# Patient Record
Sex: FEMALE | Race: WHITE
[De-identification: names, ages, dates, MRNs, and addresses within clinical notes are randomized per-mention and may not be internally consistent; named-entity substitution may affect disease eponyms.]

---

## 2017-05-16 ENCOUNTER — HOSPITAL ENCOUNTER (EMERGENCY)
Dept: HOSPITAL 47 - EC | Age: 32
LOS: 1 days | Discharge: OTHER INSTITUTION | End: 2017-05-17
Payer: COMMERCIAL

## 2017-05-16 VITALS — RESPIRATION RATE: 18 BRPM

## 2017-05-16 DIAGNOSIS — F31.9: ICD-10-CM

## 2017-05-16 DIAGNOSIS — F41.9: ICD-10-CM

## 2017-05-16 DIAGNOSIS — R45.851: ICD-10-CM

## 2017-05-16 DIAGNOSIS — R45.850: ICD-10-CM

## 2017-05-16 DIAGNOSIS — F39: Primary | ICD-10-CM

## 2017-05-16 DIAGNOSIS — F17.200: ICD-10-CM

## 2017-05-16 DIAGNOSIS — Z79.899: ICD-10-CM

## 2017-05-16 DIAGNOSIS — Z88.6: ICD-10-CM

## 2017-05-16 DIAGNOSIS — Z88.5: ICD-10-CM

## 2017-05-16 PROCEDURE — 36415 COLL VENOUS BLD VENIPUNCTURE: CPT

## 2017-05-16 PROCEDURE — 80306 DRUG TEST PRSMV INSTRMNT: CPT

## 2017-05-16 PROCEDURE — 85025 COMPLETE CBC W/AUTO DIFF WBC: CPT

## 2017-05-16 PROCEDURE — 81025 URINE PREGNANCY TEST: CPT

## 2017-05-16 PROCEDURE — 82075 ASSAY OF BREATH ETHANOL: CPT

## 2017-05-16 PROCEDURE — 99285 EMERGENCY DEPT VISIT HI MDM: CPT

## 2017-05-16 PROCEDURE — 81001 URINALYSIS AUTO W/SCOPE: CPT

## 2017-05-16 PROCEDURE — 80053 COMPREHEN METABOLIC PANEL: CPT

## 2017-05-16 NOTE — ED
Psych HPI





- General


Source: patient, RN notes reviewed, old records reviewed


Mode of arrival: ambulatory





<JudyMervat - Last Filed: 05/17/17 02:41>





<Richar Navas - Last Filed: 05/17/17 06:13>





- General


Chief Complaint: Psychiatric Symptoms


Stated Complaint: suicidal


Time Seen by Provider: 05/16/17 23:06





- History of Present Illness


Initial Comments: 





this is a 31-year-old female presenting to the emergency department with chief 

complaint of suicidal ideation.  She also reports that she's had thoughts of 

homicidal ideations.  She was not coming U she would want to kill.  Patient 

reports that she thought about trying falling asleep.  Patient states that she'

s been admitted for inpatient treatment in the past.  She states that she does 

have medications and follows up with [facility] and takes them regularly.  She reports 

that she's been feeling this way over the past few weeks.  Patient is here with 

her friend.  Friend reports that she has been somewhat neglectful to her 

children.  Patient is very withdrawn. (Mervat Kim)





- Related Data


 Home Medications











 Medication  Instructions  Recorded  Confirmed


 


Lithium Carbonate 300 mg PO DAILY 05/16/17 05/16/17


 


Lurasidone [Latuda] 40 mg PO DAILY 05/16/17 05/16/17











 Allergies











Allergy/AdvReac Type Severity Reaction Status Date / Time


 


acetaminophen Allergy  Unknown Verified 05/16/17 19:58





[From Tylenol-Codeine #3]     


 


codeine phosphate Allergy  Unknown Verified 05/16/17 19:58





[From Tylenol-Codeine #3]     














Review of Systems


ROS Other: All systems not noted in ROS Statement are negative.





<Cecy Kimily - Last Filed: 05/17/17 02:41>


ROS Other: All systems not noted in ROS Statement are negative.





<Richar Navas - Last Filed: 05/17/17 06:13>


ROS Statement: 


Those systems with pertinent positive or pertinent negative responses have been 

documented in the HPI.








Past Medical History


Past Medical History: No Reported History


Additional Past Medical History / Comment(s): First pregnancy was a Vaginal 

Delivery in 2007, 9 lbs. 1 ounce.  This is her second pregnancy.  she has had 

good prenatal care with me since 11 weeks gestation.her blood type is A+ 

antibody is negative, rubella immune, RPR nonreactive, HIV nonreactive, 

hepatitis B negative. She declined quad screen but had a normal anatomy 

ultrasoundat 19 weeks. group beta strep is negative.


History of Any Multi-Drug Resistant Organisms: None Reported


Past Surgical History: Orthopedic Surgery


Additional Past Surgical History / Comment(s): 1.surgery on right leg 2004, tavia 

in her femur. 2. LEEP


Past Anesthesia/Blood Transfusion Reactions: No Reported Reaction


Past Psychological History: Anxiety, Bipolar, Depression


Smoking Status: Current every day smoker


Past Alcohol Use History: None Reported


Past Drug Use History: None Reported





<Mervat Kim - Last Filed: 05/17/17 02:41>





General Exam


Limitations: no limitations


General appearance: alert, in no apparent distress


Head exam: Present: atraumatic, normocephalic, normal inspection


Eye exam: Present: normal appearance, PERRL, EOMI.  Absent: scleral icterus, 

conjunctival injection, periorbital swelling


ENT exam: Present: normal exam, mucous membranes moist


Neck exam: Present: normal inspection.  Absent: tenderness, meningismus, 

lymphadenopathy


Respiratory exam: Present: normal lung sounds bilaterally.  Absent: respiratory 

distress, wheezes, rales, rhonchi, stridor


Cardiovascular Exam: Present: regular rate, normal rhythm, normal heart sounds.

  Absent: systolic murmur, diastolic murmur, rubs, gallop, clicks


GI/Abdominal exam: Present: soft, normal bowel sounds.  Absent: distended, 

tenderness, guarding, rebound, rigid


Extremities exam: Present: normal inspection, full ROM, normal capillary 

refill.  Absent: tenderness, pedal edema, joint swelling, calf tenderness


Back exam: Present: normal inspection


Neurological exam: Present: alert, oriented X3, CN II-XII intact


Psychiatric exam: Present: normal affect, depressed, homicidal ideation, 

suicidal ideation (Patient reports that she wants to harm herself.  Patient 

reports that she does not care if she would turn the stove on and follow sleep.

  She reports that it she would even do this of her children were in the house.)

.  Absent: normal mood


Skin exam: Present: warm, dry, intact, normal color.  Absent: rash





<Mervat Kim - Last Filed: 05/17/17 02:41>





<Richar Navas - Last Filed: 05/17/17 06:13>





- General Exam Comments


Initial Comments: 





This is a 31-year-old female.  No distress. (Mervat Kim)





Medical Decision Making





<Mervat iKm - Last Filed: 05/17/17 02:41>





- Lab Data


Result diagrams: 


 05/17/17 04:25





 05/17/17 04:25





<Richar Navas - Last Filed: 05/17/17 06:13>





- Medical Decision Making





This is a 31-year-old female with suicidal ideations.  Patient was evaluated by 

EPS after being medically clear.  EPS stated that she may have multiple 

alarming statements about suicide and homicide.  Patient will be petitioned.  

At this time patient will be in a psychiatric hold in the emergency department 

until his available bed is there. (Mervat Kim)





Briefly spoke with patient, who had been sleeping, in order to fill clinical 

certification.





I saw this patient in conjunction with the physician assistant.  I performed 

independent history and physical exam.  Agree with case management. (Richar Navas)





- Lab Data





 Lab Results











  05/17/17 05/17/17 05/17/17 Range/Units





  01:18 01:18 04:25 


 


WBC    5.2  (3.8-10.6)  k/uL


 


RBC    4.66  (3.80-5.40)  m/uL


 


Hgb    13.8  (11.4-16.0)  gm/dL


 


Hct    40.6  (34.0-46.0)  %


 


MCV    87.3  (80.0-100.0)  fL


 


MCH    29.7  (25.0-35.0)  pg


 


MCHC    34.0  (31.0-37.0)  g/dL


 


RDW    12.7  (11.5-15.5)  %


 


Plt Count    152  (150-450)  k/uL


 


Sodium     (137-145)  mmol/L


 


Potassium     (3.5-5.1)  mmol/L


 


Chloride     ()  mmol/L


 


Carbon Dioxide     (22-30)  mmol/L


 


Anion Gap     mmol/L


 


BUN     (7-17)  mg/dL


 


Creatinine     (0.52-1.04)  mg/dL


 


Est GFR (MDRD) Af Amer     (>60 ml/min/1.73 sqM)  


 


Est GFR (MDRD) Non-Af     (>60 ml/min/1.73 sqM)  


 


Glucose     (74-99)  mg/dL


 


Calcium     (8.4-10.2)  mg/dL


 


Total Bilirubin     (0.2-1.3)  mg/dL


 


AST     (14-36)  U/L


 


ALT     (9-52)  U/L


 


Alkaline Phosphatase     ()  U/L


 


Total Protein     (6.3-8.2)  g/dL


 


Albumin     (3.5-5.0)  g/dL


 


Urine Color  Yellow    


 


Urine Appearance  Cloudy H    (Clear)  


 


Urine pH  5.5    (5.0-8.0)  


 


Ur Specific Gravity  1.026    (1.001-1.035)  


 


Urine Protein  Trace H    (Negative)  


 


Urine Glucose (UA)  Negative    (Negative)  


 


Urine Ketones  Negative    (Negative)  


 


Urine Blood  Negative    (Negative)  


 


Urine Nitrite  Negative    (Negative)  


 


Urine Bilirubin  Negative    (Negative)  


 


Urine Urobilinogen  4.0    (<2.0)  mg/dL


 


Ur Leukocyte Esterase  Negative    (Negative)  


 


Urine RBC  <1    (0-5)  /hpf


 


Urine WBC  2    (0-5)  /hpf


 


Ur Squamous Epith Cells  7 H    (0-4)  /hpf


 


Amorphous Sediment  Rare H    (None)  /hpf


 


Urine Bacteria  Rare H    (None)  /hpf


 


Urine Mucus  Few H    (None)  /hpf


 


Urine HCG, Qual   Not Detected   (Not Detectd)  


 


Urine Opiates Screen  Not Detected    (NotDetected)  


 


Ur Oxycodone Screen  Not Detected    (NotDetected)  


 


Urine Methadone Screen  Not Detected    (NotDetected)  


 


Ur Propoxyphene Screen  Not Detected    (NotDetected)  


 


Ur Barbiturates Screen  Not Detected    (NotDetected)  


 


U Tricyclic Antidepress  Not Detected    (NotDetected)  


 


Ur Phencyclidine Scrn  Not Detected    (NotDetected)  


 


Ur Amphetamines Screen  Not Detected    (NotDetected)  


 


U Methamphetamines Scrn  Not Detected    (NotDetected)  


 


U Benzodiazepines Scrn  Not Detected    (NotDetected)  


 


Urine Cocaine Screen  Not Detected    (NotDetected)  


 


U Marijuana (THC) Screen  Detected H    (NotDetected)  














  05/17/17 Range/Units





  04:25 


 


WBC   (3.8-10.6)  k/uL


 


RBC   (3.80-5.40)  m/uL


 


Hgb   (11.4-16.0)  gm/dL


 


Hct   (34.0-46.0)  %


 


MCV   (80.0-100.0)  fL


 


MCH   (25.0-35.0)  pg


 


MCHC   (31.0-37.0)  g/dL


 


RDW   (11.5-15.5)  %


 


Plt Count   (150-450)  k/uL


 


Sodium  139  (137-145)  mmol/L


 


Potassium  4.1  (3.5-5.1)  mmol/L


 


Chloride  106  ()  mmol/L


 


Carbon Dioxide  25  (22-30)  mmol/L


 


Anion Gap  8  mmol/L


 


BUN  7  (7-17)  mg/dL


 


Creatinine  0.80  (0.52-1.04)  mg/dL


 


Est GFR (MDRD) Af Amer  >60  (>60 ml/min/1.73 sqM)  


 


Est GFR (MDRD) Non-Af  >60  (>60 ml/min/1.73 sqM)  


 


Glucose  89  (74-99)  mg/dL


 


Calcium  9.5  (8.4-10.2)  mg/dL


 


Total Bilirubin  0.4  (0.2-1.3)  mg/dL


 


AST  46 H  (14-36)  U/L


 


ALT  99 H  (9-52)  U/L


 


Alkaline Phosphatase  49  ()  U/L


 


Total Protein  6.2 L  (6.3-8.2)  g/dL


 


Albumin  3.7  (3.5-5.0)  g/dL


 


Urine Color   


 


Urine Appearance   (Clear)  


 


Urine pH   (5.0-8.0)  


 


Ur Specific Gravity   (1.001-1.035)  


 


Urine Protein   (Negative)  


 


Urine Glucose (UA)   (Negative)  


 


Urine Ketones   (Negative)  


 


Urine Blood   (Negative)  


 


Urine Nitrite   (Negative)  


 


Urine Bilirubin   (Negative)  


 


Urine Urobilinogen   (<2.0)  mg/dL


 


Ur Leukocyte Esterase   (Negative)  


 


Urine RBC   (0-5)  /hpf


 


Urine WBC   (0-5)  /hpf


 


Ur Squamous Epith Cells   (0-4)  /hpf


 


Amorphous Sediment   (None)  /hpf


 


Urine Bacteria   (None)  /hpf


 


Urine Mucus   (None)  /hpf


 


Urine HCG, Qual   (Not Detectd)  


 


Urine Opiates Screen   (NotDetected)  


 


Ur Oxycodone Screen   (NotDetected)  


 


Urine Methadone Screen   (NotDetected)  


 


Ur Propoxyphene Screen   (NotDetected)  


 


Ur Barbiturates Screen   (NotDetected)  


 


U Tricyclic Antidepress   (NotDetected)  


 


Ur Phencyclidine Scrn   (NotDetected)  


 


Ur Amphetamines Screen   (NotDetected)  


 


U Methamphetamines Scrn   (NotDetected)  


 


U Benzodiazepines Scrn   (NotDetected)  


 


Urine Cocaine Screen   (NotDetected)  


 


U Marijuana (THC) Screen   (NotDetected)  














Disposition





<Mervat Kim - Last Filed: 05/17/17 02:41>





<Richar Navas - Last Filed: 05/17/17 06:13>


Clinical Impression: 


 Mood disorder





Disposition: ADMITTED AS IP TO THIS HOSP


Condition: Fair


Referrals: 


Pankaj Acosta MD [Primary Care Provider] - 1-2 days

## 2017-05-17 VITALS — DIASTOLIC BLOOD PRESSURE: 61 MMHG | SYSTOLIC BLOOD PRESSURE: 93 MMHG | HEART RATE: 68 BPM | TEMPERATURE: 98.1 F

## 2017-05-17 LAB
ALP SERPL-CCNC: 49 U/L (ref 38–126)
ALT SERPL-CCNC: 99 U/L (ref 9–52)
ANION GAP SERPL CALC-SCNC: 8 MMOL/L
AST SERPL-CCNC: 46 U/L (ref 14–36)
BASOPHILS # BLD AUTO: 0.1 K/UL (ref 0–0.2)
BASOPHILS NFR BLD AUTO: 1 %
BUN SERPL-SCNC: 7 MG/DL (ref 7–17)
CALCIUM SPEC-MCNC: 9.5 MG/DL (ref 8.4–10.2)
CH: 29.7
CHCM: 34.2
CHLORIDE SERPL-SCNC: 106 MMOL/L (ref 98–107)
CO2 SERPL-SCNC: 25 MMOL/L (ref 22–30)
EOSINOPHIL # BLD AUTO: 0.3 K/UL (ref 0–0.7)
EOSINOPHIL NFR BLD AUTO: 6 %
ERYTHROCYTE [DISTWIDTH] IN BLOOD BY AUTOMATED COUNT: 4.66 M/UL (ref 3.8–5.4)
ERYTHROCYTE [DISTWIDTH] IN BLOOD: 12.7 % (ref 11.5–15.5)
GLUCOSE SERPL-MCNC: 89 MG/DL (ref 74–99)
HCT VFR BLD AUTO: 40.6 % (ref 34–46)
HDW: 2.62
HGB BLD-MCNC: 13.8 GM/DL (ref 11.4–16)
LUC NFR BLD AUTO: 2 %
LYMPHOCYTES # SPEC AUTO: 2.6 K/UL (ref 1–4.8)
LYMPHOCYTES NFR SPEC AUTO: 50 %
MCH RBC QN AUTO: 29.7 PG (ref 25–35)
MCHC RBC AUTO-ENTMCNC: 34 G/DL (ref 31–37)
MCV RBC AUTO: 87.3 FL (ref 80–100)
MONOCYTES # BLD AUTO: 0.2 K/UL (ref 0–1)
MONOCYTES NFR BLD AUTO: 5 %
NEUTROPHILS # BLD AUTO: 1.9 K/UL (ref 1.3–7.7)
NEUTROPHILS NFR BLD AUTO: 37 %
NON-AFRICAN AMERICAN GFR(MDRD): >60
PARTICLE COUNT: 7014
PH UR: 5.5 [PH] (ref 5–8)
POTASSIUM SERPL-SCNC: 4.1 MMOL/L (ref 3.5–5.1)
PROT SERPL-MCNC: 6.2 G/DL (ref 6.3–8.2)
RBC UR QL: <1 /HPF (ref 0–5)
SODIUM SERPL-SCNC: 139 MMOL/L (ref 137–145)
SP GR UR: 1.03 (ref 1–1.03)
SQUAMOUS UR QL AUTO: 7 /HPF (ref 0–4)
UA BILLING (MACRO VS. MICRO): (no result)
UROBILINOGEN UR QL STRIP: 4 MG/DL (ref ?–2)
WBC # BLD AUTO: 0.12 10*3/UL
WBC # BLD AUTO: 5.2 K/UL (ref 3.8–10.6)
WBC #/AREA URNS HPF: 2 /HPF (ref 0–5)
WBC (PEROX): 4.97

## 2017-05-25 ENCOUNTER — HOSPITAL ENCOUNTER (EMERGENCY)
Dept: HOSPITAL 47 - EC | Age: 32
Discharge: HOME | End: 2017-05-25
Payer: COMMERCIAL

## 2017-05-25 VITALS
HEART RATE: 100 BPM | DIASTOLIC BLOOD PRESSURE: 60 MMHG | TEMPERATURE: 98.1 F | RESPIRATION RATE: 18 BRPM | SYSTOLIC BLOOD PRESSURE: 114 MMHG
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
## 2017-05-25 DIAGNOSIS — Z79.899: ICD-10-CM

## 2017-05-25 DIAGNOSIS — Z88.5: ICD-10-CM

## 2017-05-25 DIAGNOSIS — Z88.6: ICD-10-CM

## 2017-05-25 DIAGNOSIS — F17.200: ICD-10-CM

## 2017-05-25 DIAGNOSIS — F31.9: ICD-10-CM

## 2017-05-25 DIAGNOSIS — S39.012A: Primary | ICD-10-CM

## 2017-05-25 PROCEDURE — 72070 X-RAY EXAM THORAC SPINE 2VWS: CPT

## 2017-05-25 PROCEDURE — 72100 X-RAY EXAM L-S SPINE 2/3 VWS: CPT

## 2017-05-25 PROCEDURE — 99283 EMERGENCY DEPT VISIT LOW MDM: CPT

## 2017-05-25 NOTE — ED
Back Pain HPI





- General


Chief Complaint: Back Pain/Injury


Stated Complaint: back pain


Time Seen by Provider: 05/25/17 18:02


Source: patient, RN notes reviewed


Limitations: no limitations





- History of Present Illness


Initial Comments: 





32 yo female presents to the ER with cc of right sided back pain.  At this time 

the patient states she's had the pain off for about 6 months.  Patient states 

his long the right side of her back.  Patient states that she had an ultrasound 

to rule out anything in the abdomen and that showed enlarged been otherwise no 

normality.  Patient states it's been the same.  Patient states on and off.  

Patient states touching seems to make it worse.  Patient states she was 

concerned due to the continued pain so she thought that she should be 

evaluated. Patient denies any recent fever, chills, shortness of breath, chest 

pain, back pain, abdominal pain, nausea vomiting, numbness or tingling, dysuria 

or hematuria, constipation or diarrhea, headaches or visual changes, or any 

other current symptoms.





- Related Data


 Home Medications











 Medication  Instructions  Recorded  Confirmed


 


Lithium Carbonate 300 mg PO DAILY 05/16/17 05/16/17


 


Lurasidone [Latuda] 40 mg PO DAILY 05/16/17 05/16/17











 Allergies











Allergy/AdvReac Type Severity Reaction Status Date / Time


 


acetaminophen Allergy  Unknown Verified 05/25/17 17:53





[From Tylenol-Codeine #3]     


 


codeine phosphate Allergy  Unknown Verified 05/25/17 17:53





[From Tylenol-Codeine #3]     














Review of Systems


ROS Statement: 


Those systems with pertinent positive or pertinent negative responses have been 

documented in the HPI.





ROS Other: All systems not noted in ROS Statement are negative.





Past Medical History


Past Medical History: No Reported History


Additional Past Medical History / Comment(s): First pregnancy was a Vaginal 

Delivery in 2007, 9 lbs. 1 ounce.  This is her second pregnancy.  she has had 

good prenatal care with me since 11 weeks gestation.her blood type is A+ 

antibody is negative, rubella immune, RPR nonreactive, HIV nonreactive, 

hepatitis B negative. She declined quad screen but had a normal anatomy 

ultrasoundat 19 weeks. group beta strep is negative.


History of Any Multi-Drug Resistant Organisms: None Reported


Past Surgical History: Orthopedic Surgery


Additional Past Surgical History / Comment(s): 1.surgery on right leg 2004, tavia 

in her femur. 2. LEEP


Past Anesthesia/Blood Transfusion Reactions: No Reported Reaction


Past Psychological History: Anxiety, Bipolar, Depression


Smoking Status: Current every day smoker


Past Alcohol Use History: None Reported


Past Drug Use History: None Reported





General Exam





- General Exam Comments


Initial Comments: 





General:  The patient is awake and alert, in no distress, and does not appear 

acutely ill. 


Eye:  Pupils are equal, round and reactive to light, extra-ocular movements are 

intact; there is normal conjunctiva bilaterally.  No signs of icterus.  


Ears, nose, mouth and throat:  There are moist mucous membranes.


Neck:  The neck is supple, there is no tenderness.


Cardiovascular:  There is a regular rate and rhythm. No murmur, rub or gallop 

is appreciated.


Respiratory:  Lungs are clear to auscultation, respirations are non-labored, 

breath sounds are equal.  No wheezes, stridor, rales, or rhonchi.


Gastrointestinal:  Soft, non-distended, non-tender abdomen without masses or 

organomegaly noted. There is no rebound or guarding present.  No CVA 

tenderness. Bowel sounds are unremarkable.


Back:  There is no tenderness to palpation in the midline. There is no obvious 

deformity. No rashes noted.  There is some tenderness patient along the right 

paraspinal region through the lower thoracic and lumbar spine


Musculoskeletal:  Normal ROM, no tenderness, There is no pedal edema. There is 

no calf tenderness or swelling. Sensation intact. Pulses equal bilaterally 2+.  


Neurological:  CN II-XII intact, There are no obvious motor or sensory 

deficits. Coordination appears grossly intact. Speech is normal.


Skin:  Skin is warm and dry and no rashes or lesions are noted. 


Psychiatric:  Cooperative, appropriate mood & affect, normal judgment.  





Limitations: no limitations





Course


 Vital Signs











  05/25/17





  17:51


 


Temperature 98.1 F


 


Pulse Rate 100


 


Respiratory 18





Rate 


 


Blood Pressure 114/60


 


O2 Sat by Pulse 99





Oximetry 














Medical Decision Making





- Medical Decision Making





31-year-old female presents for right side pain that is more paraspinal in 

nature.  At this time we'll get x-rays patient's back.  This x-ray is reviewed 

and negative.  We discussed that this and patient makes likely has a lumbar 

strain that she keeps irritating.  We discussed we will start her Motrin.  

Discussed follow-up with her Dr. sofie peres.  Patient states she 

understood all questions were answered.  She'll be discharged home.





- Radiology Data


Radiology results: report reviewed, image reviewed





Disposition


Clinical Impression: 


 Lumbar strain





Disposition: HOME SELF-CARE


Condition: Stable


Instructions:  Chronic Back Pain (ED)


Additional Instructions: 


Please use medication as discussed. Please follow up with family doctor if 

symptoms have not improved over the next two days. Please return to the 

emergency room if your symptoms increase or worsen or for any other concerns. 


Referrals: 


Pankaj Acosta MD [Primary Care Provider] - 1-2 days


Time of Disposition: 18:37

## 2017-05-25 NOTE — XR
EXAMINATION TYPE: XR lumbar spine 2 or 3V

 

DATE OF EXAM: 5/25/2017 6:28 PM

 

COMPARISON: NONE

 

HISTORY: Back pain

 

TECHNIQUE: 3 views

 

FINDINGS: Lumbar vertebra normal spacing and alignment. Posterior elements are intact. There is no si
gn of a fracture.

 

IMPRESSION: Normal lumbar spine. Normal sacroiliac joints.

## 2017-05-25 NOTE — XR
EXAMINATION TYPE: XR thoracic spine 2V

 

DATE OF EXAM: 5/25/2017 6:28 PM

 

COMPARISON: NONE

 

HISTORY: Back pain

 

TECHNIQUE: 3 views

 

FINDINGS: The thoracic vertebra have normal spacing and alignment. I see no compression fracture. Pos
terior elements are intact. There is no sign of paraspinal mass.

 

IMPRESSION: Negative thoracic spine exam

## 2017-07-18 ENCOUNTER — HOSPITAL ENCOUNTER (INPATIENT)
Dept: HOSPITAL 47 - EC | Age: 32
LOS: 7 days | Discharge: HOME | DRG: 885 | End: 2017-07-25
Attending: PSYCHIATRY & NEUROLOGY | Admitting: PSYCHIATRY & NEUROLOGY
Payer: MEDICAID

## 2017-07-18 VITALS — BODY MASS INDEX: 20.2 KG/M2

## 2017-07-18 DIAGNOSIS — F31.9: Primary | ICD-10-CM

## 2017-07-18 DIAGNOSIS — Z88.5: ICD-10-CM

## 2017-07-18 DIAGNOSIS — R45.851: ICD-10-CM

## 2017-07-18 DIAGNOSIS — F12.21: ICD-10-CM

## 2017-07-18 DIAGNOSIS — Z79.899: ICD-10-CM

## 2017-07-18 DIAGNOSIS — Z88.1: ICD-10-CM

## 2017-07-18 DIAGNOSIS — F41.9: ICD-10-CM

## 2017-07-18 DIAGNOSIS — F17.200: ICD-10-CM

## 2017-07-18 DIAGNOSIS — E11.9: ICD-10-CM

## 2017-07-18 PROCEDURE — 84443 ASSAY THYROID STIM HORMONE: CPT

## 2017-07-18 PROCEDURE — 82075 ASSAY OF BREATH ETHANOL: CPT

## 2017-07-18 PROCEDURE — 80306 DRUG TEST PRSMV INSTRMNT: CPT

## 2017-07-18 PROCEDURE — 99285 EMERGENCY DEPT VISIT HI MDM: CPT

## 2017-07-18 PROCEDURE — 80183 DRUG SCRN QUANT OXCARBAZEPIN: CPT

## 2017-07-18 PROCEDURE — 81025 URINE PREGNANCY TEST: CPT

## 2017-07-18 PROCEDURE — 80053 COMPREHEN METABOLIC PANEL: CPT

## 2017-07-18 PROCEDURE — 85025 COMPLETE CBC W/AUTO DIFF WBC: CPT

## 2017-07-18 PROCEDURE — 81003 URINALYSIS AUTO W/O SCOPE: CPT

## 2017-07-18 RX ADMIN — GABAPENTIN SCH MG: 100 CAPSULE ORAL at 20:44

## 2017-07-18 NOTE — ED
General Adult HPI





- General


Chief complaint: Psychiatric Symptoms


Stated complaint: Mental Health


Time Seen by Provider: 07/18/17 14:16


Source: patient, RN notes reviewed


Mode of arrival: ambulatory


Limitations: no limitations





- History of Present Illness


Initial comments: 





Patient 32-year-old female who presents emergency room today with chief 

complaint of suicidal ideation.  She does admit that over the last week and #

increased thoughts.  She does admit that she thought about cutting her wrist 

earlier today.  She states that she did go to her counselor today but did not 

bring this up.  States she's been unhappy with her counselor.  Patient denies 

any other physical complaints.  Denies any auditory or visual hallucinations.  

Denies any homicidal thoughts or plans. Patient denies any recent fever, chills

, shortness of breath, chest pain, back pain, abdominal pain, nausea or vomiting

, numbness or tingling, dysuria or hematuria, constipation or diarrhea, 

headaches or visual changes, or any other complaints.





- Related Data


 Home Medications











 Medication  Instructions  Recorded  Confirmed


 


Gabapentin [Neurontin] 100 mg PO TID 07/18/17 07/18/17


 


OXcarbazepine [Trileptal] 300 mg PO BID 07/18/17 07/18/17


 


Paliperidone IM [Invega Sustenna] 156 mg IM Q30D 07/18/17 07/18/17











 Allergies











Allergy/AdvReac Type Severity Reaction Status Date / Time


 


acetaminophen Allergy  Unknown Verified 07/18/17 14:35





[From Tylenol-Codeine #3]     


 


codeine phosphate Allergy  Unknown Verified 07/18/17 14:35





[From Tylenol-Codeine #3]     














Review of Systems


ROS Statement: 


Those systems with pertinent positive or pertinent negative responses have been 

documented in the HPI.





ROS Other: All systems not noted in ROS Statement are negative.





Past Medical History


Past Medical History: No Reported History


Additional Past Medical History / Comment(s): First pregnancy was a Vaginal 

Delivery in 2007, 9 lbs. 1 ounce.  This is her second pregnancy.  she has had 

good prenatal care with me since 11 weeks gestation.her blood type is A+ 

antibody is negative, rubella immune, RPR nonreactive, HIV nonreactive, 

hepatitis B negative. She declined quad screen but had a normal anatomy 

ultrasoundat 19 weeks. group beta strep is negative.


History of Any Multi-Drug Resistant Organisms: None Reported


Past Surgical History: Orthopedic Surgery


Additional Past Surgical History / Comment(s): 1.surgery on right leg 2004, tavia 

in her femur. 2. LEEP


Past Anesthesia/Blood Transfusion Reactions: No Reported Reaction


Past Psychological History: Anxiety, Bipolar, Depression


Smoking Status: Current every day smoker


Past Alcohol Use History: None Reported


Past Drug Use History: None Reported





General Exam





- General Exam Comments


Initial Comments: 





General:  The patient is awake and alert, in no distress, and does not appear 

acutely ill. 


Eye:  Pupils are equal, round and reactive to light, extra-ocular movements are 

intact.  No nystagmus.  There is normal conjunctiva bilaterally.  No signs of 

icterus.  


Ears, nose, mouth and throat:  There are moist mucous membranes and no oral 

lesions. 


Neck:  The neck is supple, there is no tenderness or JVD.  


Cardiovascular:  There is a regular rate and rhythm. No murmur, rub or gallop 

is appreciated.


Respiratory:  Lungs are clear to auscultation, respirations are non-labored, 

breath sounds are equal.  No wheezes, stridor, rales, or rhonchi.


Musculoskeletal:  Normal ROM, no tenderness.  Strength 5/5. Sensation intact. 

Pulses equal bilaterally 2+.  


Neurological:  A&O x 3. CN II-XII intact, There are no obvious motor or sensory 

deficits. Coordination appears grossly intact. Speech is normal.


Skin:  Skin is warm and dry and no rashes or lesions are noted. 


Psychiatric:  Cooperative, appropriate mood & affect, normal judgment.  


Limitations: no limitations





Course


 Vital Signs











  07/18/17





  14:07


 


Temperature 98.7 F


 


Pulse Rate 75


 


Respiratory 18





Rate 


 


Blood Pressure 108/61


 


O2 Sat by Pulse 99





Oximetry 














Medical Decision Making





- Medical Decision Making





Patient was examined by Psych here in the ER and they recommend to be admitted.

  





- Lab Data


 Lab Results











  07/18/17 07/18/17 Range/Units





  15:08 15:08 


 


Urine HCG, Qual   Not Detected  (Not Detectd)  


 


Urine Opiates Screen  Not Detected   (NotDetected)  


 


Ur Oxycodone Screen  Not Detected   (NotDetected)  


 


Urine Methadone Screen  Not Detected   (NotDetected)  


 


Ur Propoxyphene Screen  Not Detected   (NotDetected)  


 


Ur Barbiturates Screen  Not Detected   (NotDetected)  


 


U Tricyclic Antidepress  Not Detected   (NotDetected)  


 


Ur Phencyclidine Scrn  Not Detected   (NotDetected)  


 


Ur Amphetamines Screen  Not Detected   (NotDetected)  


 


U Methamphetamines Scrn  Not Detected   (NotDetected)  


 


U Benzodiazepines Scrn  Not Detected   (NotDetected)  


 


Urine Cocaine Screen  Not Detected   (NotDetected)  


 


U Marijuana (THC) Screen  Detected H   (NotDetected)  














Disposition


Clinical Impression: 


 Suicidal ideation





Disposition: TRANSFER TO PSYCH HOSP/UNIT


Condition: Stable


Referrals: 


Pankaj Acosta MD [Primary Care Provider] - 1-2 days


Time of Disposition: 16:31

## 2017-07-19 LAB
ALP SERPL-CCNC: 45 U/L (ref 38–126)
ALT SERPL-CCNC: 38 U/L (ref 9–52)
ANION GAP SERPL CALC-SCNC: 9 MMOL/L
AST SERPL-CCNC: 20 U/L (ref 14–36)
BASOPHILS # BLD AUTO: 0.1 K/UL (ref 0–0.2)
BASOPHILS NFR BLD AUTO: 1 %
BUN SERPL-SCNC: 12 MG/DL (ref 7–17)
CALCIUM SPEC-MCNC: 9.7 MG/DL (ref 8.4–10.2)
CH: 29.6
CHCM: 33.9
CHLORIDE SERPL-SCNC: 105 MMOL/L (ref 98–107)
CO2 SERPL-SCNC: 26 MMOL/L (ref 22–30)
EOSINOPHIL # BLD AUTO: 0.2 K/UL (ref 0–0.7)
EOSINOPHIL NFR BLD AUTO: 4 %
ERYTHROCYTE [DISTWIDTH] IN BLOOD BY AUTOMATED COUNT: 5.1 M/UL (ref 3.8–5.4)
ERYTHROCYTE [DISTWIDTH] IN BLOOD: 13.3 % (ref 11.5–15.5)
GLUCOSE SERPL-MCNC: 109 MG/DL (ref 74–99)
HCT VFR BLD AUTO: 44.7 % (ref 34–46)
HDW: 2.43
HGB BLD-MCNC: 15.1 GM/DL (ref 11.4–16)
LUC NFR BLD AUTO: 1 %
LYMPHOCYTES # SPEC AUTO: 1.8 K/UL (ref 1–4.8)
LYMPHOCYTES NFR SPEC AUTO: 41 %
MCH RBC QN AUTO: 29.5 PG (ref 25–35)
MCHC RBC AUTO-ENTMCNC: 33.6 G/DL (ref 31–37)
MCV RBC AUTO: 87.7 FL (ref 80–100)
MONOCYTES # BLD AUTO: 0.1 K/UL (ref 0–1)
MONOCYTES NFR BLD AUTO: 3 %
NEUTROPHILS # BLD AUTO: 2.2 K/UL (ref 1.3–7.7)
NEUTROPHILS NFR BLD AUTO: 50 %
NON-AFRICAN AMERICAN GFR(MDRD): >60
POTASSIUM SERPL-SCNC: 4.1 MMOL/L (ref 3.5–5.1)
PROT SERPL-MCNC: 6.9 G/DL (ref 6.3–8.2)
SODIUM SERPL-SCNC: 140 MMOL/L (ref 137–145)
WBC # BLD AUTO: 0.05 10*3/UL
WBC # BLD AUTO: 4.3 K/UL (ref 3.8–10.6)
WBC (PEROX): 4.08

## 2017-07-19 RX ADMIN — NICOTINE SCH: 14 PATCH, EXTENDED RELEASE TRANSDERMAL at 09:27

## 2017-07-19 RX ADMIN — GABAPENTIN SCH MG: 100 CAPSULE ORAL at 09:27

## 2017-07-19 NOTE — P.HP
Psychiatric H&P





- .


H&P Date: 07/19/17


History & Physical: 


 Allergies











Allergy/AdvReac Type Severity Reaction Status Date / Time


 


acetaminophen Allergy  Unknown Verified 07/18/17 14:35





[From Tylenol-Codeine #3]     


 


codeine phosphate Allergy  Unknown Verified 07/18/17 14:35





[From Tylenol-Codeine #3]     








 Vital Signs











Temp  97.9 F   07/19/17 06:57


 


Pulse  61   07/19/17 06:57


 


Resp  12   07/19/17 06:57


 


BP  90/54   07/19/17 06:57


 


Pulse Ox  95   07/18/17 19:42








 Intake & Output











 07/18/17 07/19/17 07/19/17





 18:59 06:59 18:59


 


Weight 61.235 kg  








 Laboratory Last Values











Urine HCG, Qual  Not Detected  (Not Detectd)   07/18/17  15:08    


 


Urine Opiates Screen  Not Detected  (NotDetected)   07/18/17  15:08    


 


Ur Oxycodone Screen  Not Detected  (NotDetected)   07/18/17  15:08    


 


Urine Methadone Screen  Not Detected  (NotDetected)   07/18/17  15:08    


 


Ur Propoxyphene Screen  Not Detected  (NotDetected)   07/18/17  15:08    


 


Ur Barbiturates Screen  Not Detected  (NotDetected)   07/18/17  15:08    


 


U Tricyclic Antidepress  Not Detected  (NotDetected)   07/18/17  15:08    


 


Ur Phencyclidine Scrn  Not Detected  (NotDetected)   07/18/17  15:08    


 


Ur Amphetamines Screen  Not Detected  (NotDetected)   07/18/17  15:08    


 


U Methamphetamines Scrn  Not Detected  (NotDetected)   07/18/17  15:08    


 


U Benzodiazepines Scrn  Not Detected  (NotDetected)   07/18/17  15:08    


 


Urine Cocaine Screen  Not Detected  (NotDetected)   07/18/17  15:08    


 


U Marijuana (THC) Screen  Detected  (NotDetected)  H  07/18/17  15:08    











07/19/17 08:32


DATE OF SERVICE:  07/19/2017 





IDENTIFYING DATA: This patient is a 32-year-old single  female who was 

admitted to the mental health unit through the emergency room on a voluntary 

admit. 





HISTORY OF PRESENT ILLNESS: The patient presents with depressed mood, and 

suicidal ideation.  Patient states that she has not felt good for a long time 

and now is feeling worse over the last 2 weeks.  States she was admitted to 

[facility] in May and she was placed on Trileptal, paliperidone 

injection, and gabapentin.  States that she thought this was working well and 

still thinks it is working and does not want to have any meds changed contrary 

to what she said in the emergency room.  She states that she was taking the 

gabapentin 3 times a day but felt out of it so she stopped the midday dose now 

only taking it morning and bedtime.  Patient states that she feels that she 

just doesn't want to live anymore.  States she does not think about her 

children because that might stop her from taking her life.  Patient states that 

she's been treated for a number of years for bipolar disorder, reports she's 

been on Abilify, Seroquel, lithium, and states that at one point her nurse 

practitioner was giving her antidepressants that made her worse.  She has a 9-

year-old and a 3-year-old she reports because of the children she cannot attend 

groups at [facility].  States she doesn't like to do something that's new that her 

therapist is trying to do with her such as coping skills.  She denies that she'

s ever cut herself burned herself or other self-injurious behavior.  She is 

currently on a 60/90 court order..  She did not mention anything about CPS.





PAST PSYCHIATRIC HISTORY: Was hospitalized at [facility] this past May, all of 

her meds were stopped and they started her on Trileptal 300 mg twice a day, 

paliperidone was started she received her injection yesterday, and gabapentin.. 





PAST MEDICAL HISTORY: None. 





ALLERGIES: Acetaminophen codeine. 





CHEMICAL DEPENDENCY HISTORY: Reports that she stop smoking marijuana about 2 

weeks ago.  Denies other drugs or alcohol. 





FAMILY PSYCHIATRIC HISTORY: Her mother is schizophrenic, she does not know 

about her brother and sister they have mental illness. 





FAMILY CHEMICAL DEPENDENCY HISTORY: Unaware. 





LEGAL HISTORY: Denies. 





SOCIAL HISTORY: Patient lives with HER-2 children age 9 and 3, the father of 

the 9 year-old is in longterm for 24 years, the 3-year-old father has never been 

involved in the child.





MENTAL STATUS EXAM: Patient alert and oriented 3, good eye contact, fair 

groomed in hospital attire/street clothing.


Speech normal volume, rate and production. 


Coherent, logical and goal directed  thought process. No DELON, no FOI. No TB/TW/

TI


Denied auditory and visual hallucinations. Denied paranoid ideation, delusions 

or IOR.


Memory grossly intact Cognition average


Mood sad, dysphoric, affect and constricted, congruent with mood.


+ suicidal ideation, denies homicidal ideation.


Insight none; Judgment grossly intact for treatment purposes





. 





STRENGTHS:  Housing. 





WEAKNESSES: Poor coping skills. 





IMPRESSIONS: 32-year-old single female came to the emergency room on her own 

for suicidal ideation that has increased over the past 2 weeks.  She met with 

her counselor yesterday and did not report this, she had her paliperidone 

injection yesterday.  She told the emergency room staff that she was interested 

in changing meds as they were not working, now she states they are working and 

does not want any change or addition.


She reports chronic suicidal ideation, with chronic dysphoria.  She cannot 

recall the last time that she was manic.  She denies hearing voices, denies 

having command hallucinations.  Although she denies self injurious behavior she 

has had thoughts of cutting.  Although it may not have a direct effect the 

possibility that she is going through cannabis withdrawal may be part of this 

mood change.


She remains suicidal, dysphoric,hopeless, feeling worthless.





Bipolar disorder type I,MRE depressed


Suicide ideation


Cannabis use disorder, severe, in early remission





PLAN: Continue inpatient psychiatric admission for safety, and treatment.


Suicide precautions and every 15 minute checks


Continue Trileptal, continue paliperidone injection/long acting, continue 

gabapentin increase the bedtime dose.. 


Recommend to [facility] increased intensity of treatment, coping skills CBT.


Milieu therapy





07/19/17 10:19

## 2017-07-20 LAB
PH UR: 5.5 [PH] (ref 5–8)
SP GR UR: 1 (ref 1–1.03)
UA BILLING (MACRO VS. MICRO): (no result)
UROBILINOGEN UR QL STRIP: <2 MG/DL (ref ?–2)

## 2017-07-20 RX ADMIN — NICOTINE SCH: 14 PATCH, EXTENDED RELEASE TRANSDERMAL at 09:36

## 2017-07-20 RX ADMIN — GABAPENTIN SCH MG: 100 CAPSULE ORAL at 09:36

## 2017-07-20 NOTE — HP
Azeem Lopez is a 32-year-old female who was admitted to the Psychiatric 
Unit at [facility].  She had come in with the chief complaint of 
suicidal ideation.  She thought about cutting her wrist.  She was unhappy with 
her counselor and came in for further evaluation.  Medications prior to 
admission were gabapentin, Trileptal and paliperidone.  



Past medical history is negative for asthma.  She had chicken pox as a child.  
She has no history of COPD.  She has been RPI nonreactive, HIV negative and hep 
B negative as well.  



Past medical history is positive for anxiety, depression and bipolar disorder.



SOCIAL HISTORY:  Patient is a current every day smoker.  



FAMILY HISTORY:  Both her parents are healthy.  



She is allergic to ACETAMINOPHEN WITH CODEINE.  



Review of systems is noncontributory.



On physical examination, respiratory rate is 12, pulse rate of 61, temperature 
97.9, blood pressure 90/54, O2 sat on room air is 95%.

HEENT is unremarkable.  Chest is clear.  Cardiovascular system reveals an S1, 
S2.  Abdomen is soft.  There is no pedal edema.



IMPRESSION:

1.  Bipolar disorder with anxiety and depression.  

2.  Suicidal ideation.  Patient does not seem to have an active medical 
problems at this time.  Will follow her closely if need be. 
DEE

## 2017-07-20 NOTE — P.PN
Progress Note - Text


INTERVERAL HISTORY: Patient was discussed at team treatment meeting, review of 

record, met with patient


Staff reports that in goal setting this morning she was going to try to have a 

better attitude more positive thinking when she was asked how she would do it 

she stated she had no idea.


Received a progress note from [facility] medication review 

on June 27 in that note they noted that patient was taking medications 

according to the hospitalization that she had just had that she had run out of 

the oral paliperidone.  She also reported that she was smoking a lot of 

marijuana and this was while she was on lithium and reported to the writer that 

she didn't think she was going to need the marijuana because she thought  

medications were good.  This sounds similar to what she told me that the 

medications that she is currently on her working but then she continues to 

report depression and suicidal ideation.  In an atypical fashion even when 

discussing what would happen to her children she continues to report suicidal 

ideation stating "once I'm gone I won't have to worry"


Today she states that her friend is going to court to become a temporary 

guardian of her children, states this is hard for her but she knows that it's 

best for her children.  I asked her if this was possibly a plan for her to 

commit suicide knowing that her children are already in guardianship with her 

friend.  She states no "this is a way that I can get more treatment".


We discussed the fact that she is here with depression and suicide ideation and 

she will not let us change any medications, she states that she is willing to 

consider stopping gabapentin because she thinks it makes her depressed and then 

suicidal.  I raised lithium again to her and she says no that there were 

problems with her liver I explained that lithium doesn't impact the liver.  

Later she said is there anything like lithium that she could take I explained 

no its unique.  Later in the day she came up to the desk requesting to be 

transferred to another hospital.  Before that there had been a very large loud 

disruption on the unit.











MENTAL STATUS EXAM: Patient alert and oriented 3, good eye contact, fair 

groomed in hospital attire/street clothing.


Speech normal volume, rate and production. 


Coherent, logical and goal directed  thought process. No DELON, no FOI. No TB/TW/

TI


Denied auditory and visual hallucinations. Denied paranoid ideation, delusions 

or IOR.


Memory grossly intact Cognition average


Mood sad, dysphoric, affect and constricted, congruent with mood.


+ suicidal ideation, denies homicidal ideation.


Insight none; Judgment grossly intact for treatment purposes





. 





IMPRESSIONS: 32-year-old single female came to the emergency room on her own 

for suicidal ideation that has increased over the past 2 weeks.  She met with 

her counselor yesterday and did not report this, she had her paliperidone 

injection yesterday.  She told the emergency room staff that she was interested 

in changing meds as they were not working, now she states they are working and 

does not want any change or addition.


She reports chronic suicidal ideation, with chronic dysphoria.  She cannot 

recall the last time that she was manic.  She denies hearing voices, denies 

having command hallucinations.  Although she denies self injurious behavior she 

has had thoughts of cutting.  Although it may not have a direct effect the 

possibility that she is going through cannabis withdrawal may be part of this 

mood change.


She remains suicidal, dysphoric,hopeless, feeling worthless.





Bipolar disorder type I,MRE depressed


Suicide ideation


Cannabis use disorder, severe, in early remission





PLAN: Continue inpatient psychiatric admission for safety, and treatment.


Suicide precautions and every 15 minute checks


Continue Trileptal, continue paliperidone injection/long acting, discontinue 

gabapentin 


Recommend to [facility] increased intensity of treatment, coping skills CBT.


Milieu therapy





07/19/17 10:19

## 2017-07-21 RX ADMIN — NICOTINE SCH: 14 PATCH, EXTENDED RELEASE TRANSDERMAL at 09:19

## 2017-07-21 RX ADMIN — GABAPENTIN SCH: 100 CAPSULE ORAL at 09:01

## 2017-07-21 NOTE — P.PN
Progress Note - Text








Progress Note - Text


INTERVERAL HISTORY: Patient was discussed at team treatment meeting, review of 

record, met with patient


Staff reports that she attends groups but no participation. 


She is reporting that she thinks she is doing good. She says goes to groups, 

she takes her meds, she takes her shower.


Yesterday her friend received temp guardian for her children, she still says it 

is so she can go to more counseling sessions, groups at [facility], not as plan to 

kill herself, she states she no longer is suicidal.


We discussed the fact that she is here with depression and suicide ideation and 

will not let us change any medications, she requested to stop gabapentin 

because she thinks it makes her depressed and then suicidal. It was d/c'd after 

she received morning dose yesterday.


I raised lithium again to her and she says no insists her liver was harmed by 

it. 


She would like to go to [facility] as they have more therapy than we do.








MENTAL STATUS EXAM: Patient alert and oriented 3, good eye contact, fair 

groomed in hospital attire/street clothing.


Speech normal volume, rate and production. 


Coherent, logical and goal directed  thought process. No DELON, no FOI. No TB/TW/

TI


Denied auditory and visual hallucinations. Denied paranoid ideation, delusions 

or IOR.


Memory grossly intact Cognition average


Mood sad, dysphoric, affect flat, congruent with mood.


Denies suicidal ideation, denies homicidal ideation.


Insight none; Judgment grossly intact for treatment purposes





. 





IMPRESSIONS: 32-year-old single female came to the emergency room on her own 

for suicidal ideation that has increased over the past 2 weeks.  She continues 

with flat affect, ?blunted mood, now denies SI but this seems w/o any real 

change. She seems to be minimizing she has no change in mood. Concern is her 

granting guardianship of children so that she has them cared for when/if she 

kills herself.





Bipolar disorder type I,MRE depressed


Suicide ideation


Cannabis use disorder, severe, in early remission





PLAN: Continue inpatient psychiatric admission for safety, and treatment.


Suicide precautions and every 15 minute checks


Continue Trileptal, continue paliperidone injection/long acting 


Recommend to [facility] increased intensity of treatment, coping skills CBT.


Milieu therapy





07/19/17 10:19

## 2017-07-22 RX ADMIN — LITHIUM CARBONATE SCH MG: 300 CAPSULE, GELATIN COATED ORAL at 20:13

## 2017-07-22 NOTE — P.PN
Progress Note - Text





Interval history: The patient is found in the hallway she follows me to an 

interview room.  She was admitted for suicidal ideation in the context of 

feeling depressed with a history of bipolar disorder.  She is being treated 

with Trileptal she is on Invega Sustenna.  She states Dr. Avery has been 

discussing the use of lithium with her and although she had been "putting her 

off" she is now interested in trying that medication again.  Labs were reviewed 

BUN/creatinine creatinine and TSH are within normal limits.  We discussed 

benefits and side effects of lithium.  She states that she does feel safer but 

does still feel significantly depressed.





Mental status exam: The patient is a  female she appears older than 

her stated age.  She has short hair she is a visible tattoo on her neck.  Eye 

contact is appropriate she is soft-spoken but does have spontaneous speech.  

She describes a depressed mood she was admitted with suicidal ideation but 

feels safe here in the hospital.  She does not appear hypomanic or manic at 

this time.  She does not appear psychotic.  Insight and judgment limited.  She 

demonstrates no verbal or physical aggressiveness.





Plan: The patient will continue on her current psychiatric medications we will 

add lithium carbonate 300 mg at bedtime for further stabilization of mood and 

possibly addressed suicidal thoughts.  Vital signs reviewed blood pressure is 

on lower and we will monitor further.

## 2017-07-23 RX ADMIN — MAGNESIUM HYDROXIDE PRN MG: 2400 SUSPENSION ORAL at 13:50

## 2017-07-23 RX ADMIN — LITHIUM CARBONATE SCH MG: 300 CAPSULE, GELATIN COATED ORAL at 20:43

## 2017-07-23 NOTE — P.PN
Progress Note - Text





Interval history: The patient is found in group she follows me to an interview 

room.  She reports that her mood is "good".  She states she had a very positive 

visit from friends last evening and she anticipates they will visit again this 

evening.  She has been able to speak to her children daily via phone.  A close 

friend of her has temporary guardianship of her children.  We did initiate the 

lithium last evening she is reporting no concerns or side effects at this 

point.  She has no questions regarding her medication in general.  She voices 

thoughts of wanting to be discharged soon and hopes to engage in treatment with 

[facility].





Mental status exam: The patient is a  female dressed in her own 

clothing.  Hygiene grooming adequate.  Eye contact is appropriate speech is 

fluent spontaneous nonpressured.  She states her mood is "good".  Affect 

demonstrates a mild range of expression.  She is reporting no suicidal or 

homicidal ideation intent or plan.  She endorses no auditory or visual 

hallucinations or specific delusions there is no evidence of psychosis.  She 

does not appear hypomanic or manic.  She remains oriented to person place and 

date.  Insight and judgment improving.





Plan: The patient will continue on her current medications.  We will encourage 

her continued participation in the milieu.  We will monitor her for safety.  

Vital signs reviewed.

## 2017-07-24 RX ADMIN — MAGNESIUM HYDROXIDE PRN MG: 2400 SUSPENSION ORAL at 15:01

## 2017-07-24 NOTE — P.PN
Progress Note - Text














INTERVERAL HISTORY: Patient was discussed at team treatment meeting, review of 

record, met with patient


Staff reports that she attends groups is participating more.  Social work from 

[facility] said that they will be able to provide additional services to her including 

peer support.


She has reported for the past 2 days that her mood is good, she had visitors 

over the weekend which made her happy.  She believes that stopping the 

gabapentin has been helpful that she is not depressed, and that her mind is not 

as cloudy.  She also states that she has been taking the lithium now for 2 

nights and has not noticed any negative side effects other than possibly 

constipation but she had that before she started lithium.


She is willing to allow an increase of the lithium.  She would like to be 

discharged as soon as possible, I said that we could possibly consider 

discharge tomorrow or Wednesday depending upon how she does with the lithium.


She is denying suicidal ideation.  Denies that giving her friend custody of her 

children is part of a plan to take her life.  Says she wants the best for her 

children, and believes that she needs to have more treatment and that she 

cannot manage her children and get the treatment.











MENTAL STATUS EXAM: Patient alert and oriented 3, good eye contact, fair 

groomed in hospital attire/street clothing.


Speech normal volume, rate and production. 


Coherent, logical and goal directed  thought process. No DELON, no FOI. No TB/TW/

TI


Denied auditory and visual hallucinations. Denied paranoid ideation, delusions 

or IOR.


Memory grossly intact Cognition average


Mood neutral, affect full range decreased intensity, congruent with mood.


Denies suicidal ideation, denies homicidal ideation.


Insight none; Judgment grossly intact for treatment purposes





. 





IMPRESSIONS: 32-year-old single female came to the emergency room on her own 

for suicidal ideation that has increased over the past 2 weeks.  Her mood and 

affect have improved, denies SI now for several days. 


Has aggreed to lithium, which may help to reduce chance of suicide. 








Bipolar disorder type I,MRE depressed


Suicide ideation


Cannabis use disorder, severe, in early remission





PLAN: Continue inpatient psychiatric admission for safety, and treatment.


Suicide precautions and every 15 minute checks


Increase lithium 600mg tonight, 900mg tomorrow night.


Continue Trileptal, continue paliperidone injection/long acting 


Recommend to [facility] increased intensity of treatment, coping skills CBT.


Milieu therapy

## 2017-07-24 NOTE — CONS
This patient was admitted in my absence with the diagnosis of bipolar depression
, substance abuse. She has had long-standing history of insulin-dependent 
diabetes and depression. She has been on Levemir 22 units once a day and 
NovoLog sliding scale. She has also been on Lasix 40 mg once a day, lisinopril 
5 mg once a day, gabapentin 100 mg at night, Celexa 200 mg once a day and 
Apixaban 5 mg twice a day. Physical exam was done on admission. Diagnoses were:

1. Bipolar depression.

2. Substance abuse.

3. Insulin-dependent diabetes.



RECOMMENDATIONS: None. 
MTDD

## 2017-07-25 VITALS
SYSTOLIC BLOOD PRESSURE: 102 MMHG | TEMPERATURE: 97.9 F | DIASTOLIC BLOOD PRESSURE: 51 MMHG | HEART RATE: 62 BPM | RESPIRATION RATE: 14 BRPM

## 2017-07-25 NOTE — P.DS
Providers


Date of admission: 


07/18/17 17:04





Expected date of discharge: 07/25/17


Attending physician: 


Sushma Gonzales MD





Consults: 





 





07/18/17 17:09


Consult Physician Routine 


   Consulting Provider: Pankaj Acosta


   Consult Reason/Comments: follow up h & P


   Do you want consulting provider notified?: Yes











Primary care physician: 


Pankaj Acosta





Hospital Course: 





BRIEF ADMISSION HISTORY: This 32-year-old single  female admitted 

herself on a voluntary admit due to having increasing thoughts of suicide over 

the previous 2 weeks.  She had been admitted to [facility] in May and she was 

placed on Trileptal paliperidone injection and gabapentin.  She reported to her 

outpatient prescriber that she felt the meds were working well.  However over 

the past 2 weeks she began to feel more depressed and with that the suicidal 

ideation increased.  








HOSPITAL COURSE:


The patient presented in a very dysphoric, psychomotor retardation, with 

suicide ideation.  She never reported a specific plan just stating that she had 

many possibilities.  Patient was unwilling to allow any change in her medication

, the day prior to admission she just received her paliperidone long acting 

injection.  The patient has been diagnosed with bipolar disorder, treated with 

Abilify and Seroquel in the past she states that she was also treated with 

antidepressants that she thinks made her situation worse.


Once we received her [facility] outpatient note it seemed as if she had been on 

lithium while at the same time using cannabis.  Patient was unwilling to 

consider lithium.  She continued to be very dysphoric and flat affect, no 

interaction in groups.  At one point she requested to transfer to [facility] 

stating that they did more therapy than here.  We reviewed that she needs 

coping skills more then insight oriented therapy.  We again reviewed the 

possibility of starting lithium she refused, she requested to stop gabapentin 

stating that she thought it was making her depressed.  We complied with that.  

Over the weekend she agreed to restart lithium.  On Monday she reported she was 

not suicidal and that her mood was better.  She agreed to an additional 

increase of the lithium to 600 mg.  She had not endorsed suicidal ideation for 

several days.  We were concerned that she had granted her friend Patsy 

guardianship over her children but patient denied adamantly that this was not 

part of a plan to allow her to feel her children were taking care of so that 

she could kill herself.  She did begin to participate more in groups, setting 

goals for herself.  


She denied suicidal ideation, reporting that her mood was better, looking 

forward to having increased therapy at [facility].  Wanting to have DBT therapy, not 

sure how she came with that idea I recommended CBT.


Family meeting with Patsy and then discharged today.  








MENTAL STATUS EXAM: Patient alert and oriented 3, good eye contact, fair 

groomed in hospital attire/street clothing.


Speech normal volume, rate and production. 


Coherent, logical and goal directed  thought process. No DELON, no FOI. No TB/TW/

TI


Denied auditory and visual hallucinations. Denied paranoid ideation, delusions 

or IOR.


Memory grossly intact Cognition average


Mood euthymic affect full range decreased intensity, congruent with mood.


Denies suicidal ideation, denies homicidal ideation.


Insight none; Judgment grossly intact for treatment purposes





. 





IMPRESSIONS: 32-year-old single female came to the emergency room on her own 

for suicidal ideation that has increased over the past 2 weeks.  Her mood and 

affect have improved, denies SI now for several days. 


Has aggreed to lithium, which may help to reduce chance of suicide. 








Bipolar disorder type I,MRE depressed


Suicide ideation


Cannabis use disorder, severe, in early remission





PLAN: 


SW to arrange [facility] follow up.


Discharge after family conference.


Pertinent Studies: 





none


Procedures: 





none





Plan - Discharge Summary


New Discharge Prescriptions: 


New


   diphenhydrAMINE [Benadryl] 50 mg PO HS PRN #60 cap


     PRN Reason: Insomnia


   Lithium Carbonate 600 mg PO HS #60 cap





Continue


   OXcarbazepine [Trileptal] 300 mg PO BID #60 


   Paliperidone IM [Invega Sustenna] 156 mg IM Q30D #1 





Discontinued


   Gabapentin [Neurontin] 100 mg PO TID


Discharge Medication List





Lithium Carbonate 600 mg PO HS #60 cap 07/25/17 [Rx]


OXcarbazepine [Trileptal] 300 mg PO BID #60  07/25/17 [Rx]


Paliperidone IM [Invega Sustenna] 156 mg IM Q30D #1  07/25/17 [Rx]


diphenhydrAMINE [Benadryl] 50 mg PO HS PRN #60 cap 07/25/17 [Rx]








Follow up Appointment(s)/Referral(s): 


Intake, Intake [Other] - 08/01/17 10:00 am


Pankaj Acosta MD [Primary Care Provider] - 1-2 days

## 2017-07-25 NOTE — P.PN
Progress Note - Text











INTERVERAL HISTORY: Patient was discussed at team treatment meeting, review of 

record, met with patient


Staff reports that she attends groups is participating more.  Social work from 

[facility] said that they will be able to provide additional services to her including 

peer support.


She has reported for the past 2 days that her mood is good, she had visitors 

over the weekend which made her happy.  She believes that stopping the 

gabapentin has been helpful that she is not depressed, and that her mind is not 

as cloudy.  She also states that she has been taking the lithium now for 2 

nights and has not noticed any negative side effects other than possibly 

constipation but she had that before she started lithium.


She is willing to allow an increase of the lithium.  She would like to be 

discharged as soon as possible, I said that we could possibly consider 

discharge tomorrow or Wednesday depending upon how she does with the lithium.


She is denying suicidal ideation.  Denies that giving her friend custody of her 

children is part of a plan to take her life.  Says she wants the best for her 

children, and believes that she needs to have more treatment and that she 

cannot manage her children and get the treatment.











MENTAL STATUS EXAM: Patient alert and oriented 3, good eye contact, fair 

groomed in hospital attire/street clothing.


Speech normal volume, rate and production. 


Coherent, logical and goal directed  thought process. No DELON, no FOI. No TB/TW/

TI


Denied auditory and visual hallucinations. Denied paranoid ideation, delusions 

or IOR.


Memory grossly intact Cognition average


Mood neutral, affect full range decreased intensity, congruent with mood.


Denies suicidal ideation, denies homicidal ideation.


Insight none; Judgment grossly intact for treatment purposes





. 





IMPRESSIONS: 32-year-old single female came to the emergency room on her own 

for suicidal ideation that has increased over the past 2 weeks.  Her mood and 

affect have improved, denies SI now for several days. 


Has aggreed to lithium, which may help to reduce chance of suicide. 








Bipolar disorder type I,MRE depressed


Suicide ideation


Cannabis use disorder, severe, in early remission





PLAN: Continue inpatient psychiatric admission for safety, and treatment.


Suicide precautions and every 15 minute checks


Increase lithium 600mg tonight, 900mg tomorrow night.


Continue Trileptal, continue paliperidone injection/long acting 


Recommend to [facility] increased intensity of treatment, coping skills CBT.


Milieu therapy

## 2017-08-25 ENCOUNTER — HOSPITAL ENCOUNTER (INPATIENT)
Dept: HOSPITAL 47 - EC | Age: 32
LOS: 5 days | Discharge: HOME | DRG: 885 | End: 2017-08-30
Attending: PSYCHIATRY & NEUROLOGY | Admitting: PSYCHIATRY & NEUROLOGY
Payer: MEDICAID

## 2017-08-25 DIAGNOSIS — F12.19: ICD-10-CM

## 2017-08-25 DIAGNOSIS — G47.00: ICD-10-CM

## 2017-08-25 DIAGNOSIS — F41.9: ICD-10-CM

## 2017-08-25 DIAGNOSIS — Z81.1: ICD-10-CM

## 2017-08-25 DIAGNOSIS — R45.851: ICD-10-CM

## 2017-08-25 DIAGNOSIS — Z91.19: ICD-10-CM

## 2017-08-25 DIAGNOSIS — Z81.8: ICD-10-CM

## 2017-08-25 DIAGNOSIS — F11.21: ICD-10-CM

## 2017-08-25 DIAGNOSIS — F17.210: ICD-10-CM

## 2017-08-25 DIAGNOSIS — Z60.2: ICD-10-CM

## 2017-08-25 DIAGNOSIS — Z79.899: ICD-10-CM

## 2017-08-25 DIAGNOSIS — F31.5: Primary | ICD-10-CM

## 2017-08-25 LAB
APAP SPEC-MCNC: <10 UG/ML
LITHIUM SERPL-MCNC: 0.2 MMOL/L
SALICYLATES SERPL-MCNC: <1 MG/DL

## 2017-08-25 PROCEDURE — 85025 COMPLETE CBC W/AUTO DIFF WBC: CPT

## 2017-08-25 PROCEDURE — 80178 ASSAY OF LITHIUM: CPT

## 2017-08-25 PROCEDURE — 80183 DRUG SCRN QUANT OXCARBAZEPIN: CPT

## 2017-08-25 PROCEDURE — 81025 URINE PREGNANCY TEST: CPT

## 2017-08-25 PROCEDURE — 84443 ASSAY THYROID STIM HORMONE: CPT

## 2017-08-25 PROCEDURE — 82075 ASSAY OF BREATH ETHANOL: CPT

## 2017-08-25 PROCEDURE — 36415 COLL VENOUS BLD VENIPUNCTURE: CPT

## 2017-08-25 PROCEDURE — 80306 DRUG TEST PRSMV INSTRMNT: CPT

## 2017-08-25 PROCEDURE — 83520 IMMUNOASSAY QUANT NOS NONAB: CPT

## 2017-08-25 PROCEDURE — 80053 COMPREHEN METABOLIC PANEL: CPT

## 2017-08-25 PROCEDURE — 99285 EMERGENCY DEPT VISIT HI MDM: CPT

## 2017-08-25 SDOH — SOCIAL STABILITY - SOCIAL INSECURITY: PROBLEMS RELATED TO LIVING ALONE: Z60.2

## 2017-08-26 LAB
ALP SERPL-CCNC: 53 U/L (ref 38–126)
ALT SERPL-CCNC: 44 U/L (ref 9–52)
ANION GAP SERPL CALC-SCNC: 9 MMOL/L
AST SERPL-CCNC: 21 U/L (ref 14–36)
BASOPHILS # BLD AUTO: 0 K/UL (ref 0–0.2)
BASOPHILS NFR BLD AUTO: 1 %
BUN SERPL-SCNC: 9 MG/DL (ref 7–17)
CALCIUM SPEC-MCNC: 9.6 MG/DL (ref 8.4–10.2)
CH: 30.3
CHCM: 34.4
CHLORIDE SERPL-SCNC: 106 MMOL/L (ref 98–107)
CO2 SERPL-SCNC: 25 MMOL/L (ref 22–30)
EOSINOPHIL # BLD AUTO: 0.3 K/UL (ref 0–0.7)
EOSINOPHIL NFR BLD AUTO: 6 %
ERYTHROCYTE [DISTWIDTH] IN BLOOD BY AUTOMATED COUNT: 4.46 M/UL (ref 3.8–5.4)
ERYTHROCYTE [DISTWIDTH] IN BLOOD: 14.6 % (ref 11.5–15.5)
GLUCOSE SERPL-MCNC: 94 MG/DL (ref 74–99)
HCT VFR BLD AUTO: 39.6 % (ref 34–46)
HDW: 2.67
HGB BLD-MCNC: 13.4 GM/DL (ref 11.4–16)
LUC NFR BLD AUTO: 2 %
LYMPHOCYTES # SPEC AUTO: 1.6 K/UL (ref 1–4.8)
LYMPHOCYTES NFR SPEC AUTO: 38 %
MCH RBC QN AUTO: 30.1 PG (ref 25–35)
MCHC RBC AUTO-ENTMCNC: 33.9 G/DL (ref 31–37)
MCV RBC AUTO: 88.7 FL (ref 80–100)
MONOCYTES # BLD AUTO: 0.2 K/UL (ref 0–1)
MONOCYTES NFR BLD AUTO: 4 %
NEUTROPHILS # BLD AUTO: 2.2 K/UL (ref 1.3–7.7)
NEUTROPHILS NFR BLD AUTO: 50 %
NON-AFRICAN AMERICAN GFR(MDRD): >60
POTASSIUM SERPL-SCNC: 4.4 MMOL/L (ref 3.5–5.1)
PROT SERPL-MCNC: 6.4 G/DL (ref 6.3–8.2)
SODIUM SERPL-SCNC: 140 MMOL/L (ref 137–145)
WBC # BLD AUTO: 0.09 10*3/UL
WBC # BLD AUTO: 4.3 K/UL (ref 3.8–10.6)
WBC (PEROX): 3.9

## 2017-08-26 RX ADMIN — VENLAFAXINE HYDROCHLORIDE SCH MG: 37.5 TABLET ORAL at 11:52
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
## 2017-08-27 RX ADMIN — VENLAFAXINE HYDROCHLORIDE SCH MG: 37.5 TABLET ORAL at 09:20

## 2017-08-27 NOTE — P.PN
Progress Note - Text





Date of service: 08/27/2017





Chief complaint:


"I feel little better"





Subjective:





The patient has been seen today as follow-up, chart reviewed, case discussed 

with the treatment team. Patient slept about 6 hours last night. Patient has 

been going to groups and other unit activities. Patient reports fair appetite 

problems. Patient reports her depression still there but she minimized feeling 

hopeless and denies suicidal thoughts today. She reports her anxiety still high 

and has bouts of racing thoughts, and feeling edgy. 


The patient denies any manic symptoms including sustained period of time with 

elevated or irritable mood, impulsive or irrational behavior, inflated self-

esteem, or absence need to sleep due to increases goal-directed activities.  

The patient denies any auditory or visual hallucinations.  Also the patient 

denies any paranoid ideation. The patient is compliant with her medications and 

denies any adverse reactions. 





Review of other systems: Patient denies any physical symptoms besides what has 

been mentioned above.  No breathing problems, no chest pain reported today.





Objective:





Vitals has been reviewed.








Mental status examination; 


Appearance: The patient appears stated age, adequately groomed,  no specific 

features.


Gait/posture: Normal arm was swinging: No abnormal movements.


Attitude and behavior: Not fully engaged, superficially cooperative, 

intermittent eye contact.


Motor activity: Decreased psychomotor activity


Speech: Soft, low tone, not spontaneous


Mood: Depressed


Affect: Blunted


Thought form: Not spontaneous, few, but goal-directed, linear, coherent.


Thought content: Non-delusional, denies suicidal thoughts. Denies homicidal 

thoughts. Paranoid ideation


Perception: Denies any auditory or visual hallucinations


Attention: No impairment.  Patient was able to repeat serial 7.  


Orientation: Patient patient was fully oriented to time place person and 

situation.


Insight: Patient has limited insight about his psychiatric disorder.


Judgment: Patient has limited judgment about his psychiatric treatment.








Assessment:


Bipolar disorder type I most recent episode is depressive with psychotic 

features 


R/O PTSD 


Cannabis use disorder severe 


Nicotine use disorder severe 


Opioid use disorder, in remission. 





Plan:


Continue with inpatient psychiatric hospitalization for monitoring and continue 

treatment. 


Continue group therapy and other unit activities. 


Continue psychiatric medications: 


Inc Effexor XR 75mg PO daily for depression symptoms


Continue Trileptal 450mg PO BID as mood stabilizer for mood symptoms. Trileptal 

level still pending 


Start Vistaril PRN for anxiety


Continue follow up

## 2017-08-28 RX ADMIN — Medication SCH MG: at 21:08

## 2017-08-28 NOTE — P.PN
Progress Note - Text





Interval History: Patient is a 32-year-old female who was admitted due to 

suicidal ideation with a plan to cut her wrists.  Patient was recently 

discharged from this hospital in July 2017.  At that time her children were 

removed by CPS and they currently live with a friend.  Patient reports that her 

lithium was discontinued when she was last seen at [facility] due 

to it not working.  Patient was continued on Trileptal which was increased to 

450 mg twice a day on her admission here.  Patient has also been continued on 

Effexor.  Patient reports today that she is not having any side effects from 

the medication, reports that she is no longer has any suicidal ideation.  She 

states that she is still feeling slightly depressed but her thinking is clearer 

than when it was on admission.  Patient states she's been eating well and has 

been attending groups.  Patient states that she is feeling depressed, no manic 

symptoms.  Patient reports that she is not sleeping well and has been using 

Benadryl on an as-needed basis for her sleep.





Mental Status: Appearance/Attitude: Patient is neatly and appropriately dressed

, makes good eye contact and is cooperative.


Behavior: Patient does not display any psychomotor agitation or retardation.


Speech/Language: Patient's speech is spontaneous and of normal volume and 

rhythm and she is coherent.


Thought Process: Patient is goal-directed there is no evidence of 

circumstantial or tangential thought and no loose associations or flight of 

ideas.


Thought Content: Patient is not having any auditory or visual hallucinations 

and she denies any paranoid or delusional ideation.  Patient denies any racing 

thoughts, states she is still feeling slightly depressed.  Patient reports that 

her sleep is still not good and her appetite is fair.  Patient states that she 

is not having any manic symptoms.  She feels her thinking is slightly clearer 

than it was when she was admitted.


Suicidal/Homicidal Ideation: Patient denies any suicidal or homicidal ideation 

at this time.


Sensorium/Cognition: Patient is alert and oriented to person, place, and time 

and her memory is grossly intact.


Mood/Affect: Patient's mood is slightly depressed and her affect is appropriate.


Insight/Judgement: Patient's insight and judgment are fair.





Assessment: Patient reports that she continues to feel slightly depressed but 

states that she is no longer having suicidal ideation.  She reports her 

thinking has cleared slightly since her admission.  She is on an increased dose 

of Trileptal and was begun on Effexor which was increased to 75 mg yesterday.  

Patient does have a history of manic symptoms in the past.  Patient also 

received an injection of Invega sustenna 156 mg and her next injection is due 

on September 12.  Patient reports her sleeping is still poor.





Plan: Patient will continue on Trileptal 4 and 50 mg twice a day which was 

increased at the time of her admission.  I will decrease her Effexor back to 37-

1/2 mg extended release in the morning from 75 mg so as not to cause a manic 

episode.  Patient will continue on her Invega sustenna her next injection not 

due till September 12.  I discussed with the patient the use of melatonin and 

she will begin 5 mg at bedtime to target her sleep and I suggested she try not 

to use the Benadryl and see if the melatonin is more effective for her.  

Patient was encouraged to continue to attend groups and activities and 

participate.  Patient continues to require hospitalization to stabilize her 

mood.

## 2017-08-29 VITALS — TEMPERATURE: 98.1 F

## 2017-08-29 RX ADMIN — Medication SCH MG: at 21:00

## 2017-08-29 RX ADMIN — VENLAFAXINE HYDROCHLORIDE SCH MG: 37.5 CAPSULE, EXTENDED RELEASE ORAL at 08:32

## 2017-08-29 NOTE — P.PN
Progress Note - Text





Interval History: Patient is a 32-year-old female who was seen today and 

reports that she slept quite well last evening with the melatonin.  She states 

that she feels rested this morning and doesn't have a hangover and she has when 

using Benadryl in the past.  Patient reports that she still is feeling slightly 

depressed but reports no suicidal ideation at this time.  She does not endorse 

any manic symptoms at this time here and she states she is much less depressed 

than she was on admission.  She states she has been attending groups and 

activities and has been eating well.  She states her focus and concentration 

are much improved that she has been able to read while she has been on the 

inpatient unit.  Patient reported no increase in her depressive symptoms with 

my decrease in her Effexor.





Mental Status: Appearance/Attitude: Patient is neatly and appropriately groomed

, makes good eye contact and is cooperative.


Behavior: Patient does not display any psychomotor agitation or retardation.


Speech/Language: Patient's speech is spontaneous and of normal volume and 

rhythm and she is coherent.


Thought Process: Patient is goal directed and there is no evidence of 

circumstantial or tangential thought and no loose associations or flight of 

ideas.


Thought Content: Patient denies any auditory or visual hallucinations no 

paranoid or delusional ideation is elicited.  Patient reports that she is not 

having any manic symptoms and states that she is able to focus and concentrate 

and has been reading on the unit.  She states that she is sleeping well with 

the melatonin and her appetite is good.


Suicidal/Homicidal Ideation: Patient denies any current suicidal or homicidal 

ideation.


Sensorium/Cognition: Patient is alert and oriented to person, place, and time 

and her memory is grossly intact.


Mood/Affect: Patient remains slightly depressed and her affect is appropriate.


Insight/Judgement: Patient's insight and judgment are fair





Assessment: Patient reports that she is doing well feeling much less depressed, 

sleeping much better and her concentration and focus have improved.  She is not 

expressing any suicidal ideation.  Patient reports that she has been attending 

groups and activities and has found some of them helpful. patient is not having 

any side effects from her medication.





Plan: Patient will continue on Trileptal 450 mg twice a day to target her mood, 

she will continue on Effexor 37.5 mg extended release to target her depression 

and melatonin 5 mg at bedtime to target her sleep disorder.  Patient is 

receiving Invega sustenna and is not due for her next injection until 

September12. Patient and i discussed discharge tomorrow.

## 2017-08-30 VITALS — HEART RATE: 61 BPM | DIASTOLIC BLOOD PRESSURE: 50 MMHG | SYSTOLIC BLOOD PRESSURE: 92 MMHG | RESPIRATION RATE: 16 BRPM

## 2017-08-30 RX ADMIN — VENLAFAXINE HYDROCHLORIDE SCH MG: 37.5 CAPSULE, EXTENDED RELEASE ORAL at 09:09

## 2017-08-30 NOTE — P.DS
Providers


Date of admission: 


08/25/17 18:26





Expected date of discharge: 08/30/17


Attending physician: 


Cindy Saunders MD





Consults: 





 





08/25/17 18:35


Consult Physician Routine 


   Consulting Provider: Pankaj Acosta


   Consult Reason/Comments: H & P & medical management


   Do you want consulting provider notified?: Yes











Primary care physician: 


Pankaj Acosta





Hospital Course: 





Discharge Diagnoses: Bipolar 1 disorder, current episode depressed, cannabis 

use disorder, moderate





Reason for Admission: Patient is a 32-year-old female presented to the 

emergency room reporting that she was feeling suicidal and had a plan to cut 

her wrists with a knife.  Patient was requesting admission because she had been 

feeling suicidal over the last week after her children were removed from her 

home by CPS and are now living with one of her friends.  Patient reported that 

she had been feeling depressed for months, hopeless and worthless and feeling 

helpless.  She reported a decrease in her energy and interest and motivation to 

do things.  Patient also reported a poor appetite and her concentration was 

poor.  Patient was not reporting any psychotic symptoms and did not endorse any 

manic symptoms on admission.  Patient has been compliant with her medications 

and follow-up at [facility] where her medications were recently 

changed from lithium to Trileptal.  She continues to receive Invega Sustenna on 

a monthly basis.





Hospital Course: Patient was admitted on a voluntary basis, routine laboratory 

studies were ordered and a medical consultation was obtained.  Patient was also 

ordered group and activity therapy which she attended and participated in.  

Patient's Trileptal dose was increased to 450 mg twice a day and she was begun 

on Effexor to target her depression and stabilize her mood.  Patient's Effexor 

was increased to 75 mg and was decreased back to 37.5 mg to prevent a manic 

episode.  Patient reported that she was no longer feeling depressed, her 

appetite had improved and her concentration had improved and she was reading 

books on the unit.  Patient states that she was also more motivated to do 

things and had more interest and was no longer feeling hopeless or helpless.  

Patient continued to report difficulty sleeping and so was started on melatonin 

5 mg at bedtime which she reported gave her restful sleep and she felt much 

better than using the Benadryl which she states was making her feel "hung over" 

in the morning.  Patient reported no side effects from the medication and there 

is no evidence of manic behavior.  Patient was no longer reporting any suicidal 

ideation, was no longer feeling depressed and reported that she was sleeping 

and eating well. 





Mental Status:Appearance/Attitude: Patient was neatly and appropriately dressed

, made good eye contact and was cooperative.


Behavior: Patient displayed no psychomotor agitation or retardation.


Speech/Language: Patient's speech was spontaneous and of normal volume and 

rhythm and she was coherent.


Thought Process: Patient was goal-directed and there is no evidence of 

circumstantial or tangential thought and no loose associations or flight of 

ideas.


Thought Content: Patient denied any auditory or visual hallucinations and no 

delusions or paranoid ideation were elicited.  Patient denied feeling hopeless, 

helpless or worthless and she stated that she was eating and sleeping well.  

Patient reported she was able to concentrate and focus and was reading books on 

the unit.  Patient stated she had more energy and had more motivation and 

interest to do things.


Suicidal/Homicidal Ideation: Patient denied any current suicidal or homicidal 

ideation.


Sensorium/Cognition: Patient was alert and oriented to person, place, and time 

and her memory was grossly intact.


Mood/Affect: Patient's mood was euthymic and her affect was appropriate.


Insight/Judgement: Patient's insight and judgment are fair.








 Laboratory Last Values











WBC  4.3 k/uL (3.8-10.6)   08/26/17  08:16    


 


RBC  4.46 m/uL (3.80-5.40)   08/26/17  08:16    


 


Hgb  13.4 gm/dL (11.4-16.0)   08/26/17  08:16    


 


Hct  39.6 % (34.0-46.0)   08/26/17  08:16    


 


MCV  88.7 fL (80.0-100.0)   08/26/17  08:16    


 


MCH  30.1 pg (25.0-35.0)   08/26/17  08:16    


 


MCHC  33.9 g/dL (31.0-37.0)   08/26/17  08:16    


 


RDW  14.6 % (11.5-15.5)   08/26/17  08:16    


 


Plt Count  168 k/uL (150-450)   08/26/17  08:16    


 


Neutrophils %  50 %  08/26/17  08:16    


 


Lymphocytes %  38 %  08/26/17  08:16    


 


Monocytes %  4 %  08/26/17  08:16    


 


Eosinophils %  6 %  08/26/17  08:16    


 


Basophils %  1 %  08/26/17  08:16    


 


Neutrophils #  2.2 k/uL (1.3-7.7)   08/26/17  08:16    


 


Lymphocytes #  1.6 k/uL (1.0-4.8)   08/26/17  08:16    


 


Monocytes #  0.2 k/uL (0-1.0)   08/26/17  08:16    


 


Eosinophils #  0.3 k/uL (0-0.7)   08/26/17  08:16    


 


Basophils #  0.0 k/uL (0-0.2)   08/26/17  08:16    


 


Sodium  140 mmol/L (137-145)   08/26/17  08:16    


 


Potassium  4.4 mmol/L (3.5-5.1)   08/26/17  08:16    


 


Chloride  106 mmol/L ()   08/26/17  08:16    


 


Carbon Dioxide  25 mmol/L (22-30)   08/26/17  08:16    


 


Anion Gap  9 mmol/L  08/26/17  08:16    


 


BUN  9 mg/dL (7-17)   08/26/17  08:16    


 


Creatinine  0.80 mg/dL (0.52-1.04)   08/26/17  08:16    


 


Est GFR (MDRD) Af Amer  >60  (>60 ml/min/1.73 sqM)   08/26/17  08:16    


 


Est GFR (MDRD) Non-Af  >60  (>60 ml/min/1.73 sqM)   08/26/17  08:16    


 


Glucose  94 mg/dL (74-99)   08/26/17  08:16    


 


Calcium  9.6 mg/dL (8.4-10.2)   08/26/17  08:16    


 


Total Bilirubin  0.2 mg/dL (0.2-1.3)   08/26/17  08:16    


 


AST  21 U/L (14-36)   08/26/17  08:16    


 


ALT  44 U/L (9-52)   08/26/17  08:16    


 


Alkaline Phosphatase  53 U/L ()   08/26/17  08:16    


 


Total Protein  6.4 g/dL (6.3-8.2)   08/26/17  08:16    


 


Albumin  4.0 g/dL (3.5-5.0)   08/26/17  08:16    


 


TSH  1.970 mIU/L (0.465-4.680)   08/26/17  08:16    


 


Urine HCG, Qual  Not Detected  (Not Detectd)   08/25/17  15:54    


 


Salicylates  <1.0 mg/dL  08/25/17  16:31    


 


Urine Opiates Screen  Not Detected  (NotDetected)   08/25/17  15:54    


 


Ur Oxycodone Screen  Not Detected  (NotDetected)   08/25/17  15:54    


 


Urine Methadone Screen  Not Detected  (NotDetected)   08/25/17  15:54    


 


Ur Propoxyphene Screen  Not Detected  (NotDetected)   08/25/17  15:54    


 


Acetaminophen  <10.0 ug/mL  08/25/17  16:31    


 


Ur Barbiturates Screen  Not Detected  (NotDetected)   08/25/17  15:54    


 


Oxcarbazepine  7.7 ug/mL (10-35)  L  08/26/17  08:16    


 


U Tricyclic Antidepress  Not Detected  (NotDetected)   08/25/17  15:54    


 


Ur Phencyclidine Scrn  Not Detected  (NotDetected)   08/25/17  15:54    


 


Ur Amphetamines Screen  Not Detected  (NotDetected)   08/25/17  15:54    


 


U Methamphetamines Scrn  Not Detected  (NotDetected)   08/25/17  15:54    


 


U Benzodiazepines Scrn  Not Detected  (NotDetected)   08/25/17  15:54    


 


Lithium  0.2 mmol/L  08/25/17  16:31    


 


Urine Cocaine Screen  Not Detected  (NotDetected)   08/25/17  15:54    


 


U Marijuana (THC) Screen  Detected  (NotDetected)  H  08/25/17  15:54    














Risk Assessment: Patient is at low risk for self-harm due to a suicide attempt 

at the age of 14 and none since, no self-injurious behavior history and the 

patient is compliant with treatment and medication.





Discharge Plan: Patient will be discharged to return home where she lives on 

her own.  Patient will follow-up at [facility].  Patient will 

continue on melatonin 5 mg at bedtime, Trileptal 450 mg twice a day and Effexor 

37.5 mg extended release in the morning she will be given prescriptions for 

these medications.  Patient will continue on Invega Sustenna 156 mg due on 

September 12.


Patient and I discussed the need to avoid using any cannabis.  


Patient Condition at Discharge: Stable





Plan - Discharge Summary


New Discharge Prescriptions: 


New


   Melatonin 5 mg PO HS #28 tab


   OXcarbazepine [Trileptal] 450 mg PO BID #86 tab


   Venlafaxine HCl ER [Effexor XR] 37.5 mg PO DAILY #14 cap





Continue


   Paliperidone IM [Invega Sustenna] 156 mg IM Q30D #1 





Discontinued


   diphenhydrAMINE [Benadryl] 50 mg PO HS PRN #60 cap


     PRN Reason: Insomnia


   OXcarbazepine [Trileptal] 300 mg PO BID #60 


Discharge Medication List





Paliperidone IM [Invega Sustenna] 156 mg IM Q30D #1  07/25/17 [Rx]


Melatonin 5 mg PO HS #28 tab 08/30/17 [Rx]


OXcarbazepine [Trileptal] 450 mg PO BID #86 tab 08/30/17 [Rx]


Venlafaxine HCl ER [Effexor XR] 37.5 mg PO DAILY #14 cap 08/30/17 [Rx]








Follow up Appointment(s)/Referral(s): 


[facility] [Outside] - 08/31/17 9:00 am


(08/31/2017 @ 0900 with Bhupendra Martines 





09/06/2017 @ 15:30 with Vibha Graham)


Pankaj Acosta MD [Primary Care Provider] - 1-2 days


Patient Instructions/Handouts:  Bipolar Disorder (GEN), Suicide Prevention for 

Adults (GEN)


Activity/Diet/Wound Care/Special Instructions: 


Activity and diet as tolerated. Avoid the use of street drugs and alcohol. Take 

all medications as prescribed. When you are in need of refills on your 

medications please contact your medical provider and/or outpatient psychiatrist 

to have this done. Please go to scheduled outpatient appointment for aftercare 

treatment. If symptoms return or become worse call the crisis line at [phone] and/or go to the nearest emergency room for an evaluation. 


Discharge Disposition: HOME SELF-CARE

## 2019-03-27 ENCOUNTER — HOSPITAL ENCOUNTER (EMERGENCY)
Dept: HOSPITAL 47 - EC | Age: 34
Discharge: HOME | End: 2019-03-27
Payer: COMMERCIAL

## 2019-03-27 VITALS — DIASTOLIC BLOOD PRESSURE: 65 MMHG | HEART RATE: 65 BPM | SYSTOLIC BLOOD PRESSURE: 112 MMHG

## 2019-03-27 VITALS — TEMPERATURE: 98.3 F | RESPIRATION RATE: 16 BRPM

## 2019-03-27 DIAGNOSIS — F32.9: ICD-10-CM

## 2019-03-27 DIAGNOSIS — Z21: ICD-10-CM

## 2019-03-27 DIAGNOSIS — F41.9: ICD-10-CM

## 2019-03-27 DIAGNOSIS — R53.1: ICD-10-CM

## 2019-03-27 DIAGNOSIS — E86.0: ICD-10-CM

## 2019-03-27 DIAGNOSIS — Z79.899: ICD-10-CM

## 2019-03-27 DIAGNOSIS — R55: Primary | ICD-10-CM

## 2019-03-27 DIAGNOSIS — F17.200: ICD-10-CM

## 2019-03-27 LAB
ALBUMIN SERPL-MCNC: 3.9 G/DL (ref 3.5–5)
ALP SERPL-CCNC: 57 U/L (ref 38–126)
ALT SERPL-CCNC: 79 U/L (ref 9–52)
ANION GAP SERPL CALC-SCNC: 7 MMOL/L
APTT BLD: 24.6 SEC (ref 22–30)
AST SERPL-CCNC: 41 U/L (ref 14–36)
BASOPHILS # BLD AUTO: 0.1 K/UL (ref 0–0.2)
BASOPHILS NFR BLD AUTO: 1 %
BUN SERPL-SCNC: 5 MG/DL (ref 7–17)
CALCIUM SPEC-MCNC: 9.4 MG/DL (ref 8.4–10.2)
CHLORIDE SERPL-SCNC: 104 MMOL/L (ref 98–107)
CO2 SERPL-SCNC: 27 MMOL/L (ref 22–30)
D DIMER PPP FEU-MCNC: 0.25 MG/L FEU (ref ?–0.6)
EOSINOPHIL # BLD AUTO: 0.1 K/UL (ref 0–0.7)
EOSINOPHIL NFR BLD AUTO: 2 %
ERYTHROCYTE [DISTWIDTH] IN BLOOD BY AUTOMATED COUNT: 4.69 M/UL (ref 3.8–5.4)
ERYTHROCYTE [DISTWIDTH] IN BLOOD: 13.5 % (ref 11.5–15.5)
GLUCOSE SERPL-MCNC: 101 MG/DL (ref 74–99)
HCT VFR BLD AUTO: 41.6 % (ref 34–46)
HGB BLD-MCNC: 13.7 GM/DL (ref 11.4–16)
INR PPP: 1 (ref ?–1.2)
LYMPHOCYTES # SPEC AUTO: 1.7 K/UL (ref 1–4.8)
LYMPHOCYTES NFR SPEC AUTO: 24 %
MAGNESIUM SPEC-SCNC: 1.6 MG/DL (ref 1.6–2.3)
MCH RBC QN AUTO: 29.2 PG (ref 25–35)
MCHC RBC AUTO-ENTMCNC: 33 G/DL (ref 31–37)
MCV RBC AUTO: 88.6 FL (ref 80–100)
MONOCYTES # BLD AUTO: 0.2 K/UL (ref 0–1)
MONOCYTES NFR BLD AUTO: 3 %
NEUTROPHILS # BLD AUTO: 4.9 K/UL (ref 1.3–7.7)
NEUTROPHILS NFR BLD AUTO: 69 %
PH UR: 6.5 [PH] (ref 5–8)
PLATELET # BLD AUTO: 188 K/UL (ref 150–450)
POTASSIUM SERPL-SCNC: 3.7 MMOL/L (ref 3.5–5.1)
PROT SERPL-MCNC: 6.4 G/DL (ref 6.3–8.2)
PT BLD: 10.3 SEC (ref 9–12)
SODIUM SERPL-SCNC: 138 MMOL/L (ref 137–145)
SP GR UR: 1 (ref 1–1.03)
UROBILINOGEN UR QL STRIP: 2 MG/DL (ref ?–2)
WBC # BLD AUTO: 7 K/UL (ref 3.8–10.6)

## 2019-03-27 PROCEDURE — 85730 THROMBOPLASTIN TIME PARTIAL: CPT

## 2019-03-27 PROCEDURE — 85610 PROTHROMBIN TIME: CPT

## 2019-03-27 PROCEDURE — 85025 COMPLETE CBC W/AUTO DIFF WBC: CPT

## 2019-03-27 PROCEDURE — 99284 EMERGENCY DEPT VISIT MOD MDM: CPT

## 2019-03-27 PROCEDURE — 83735 ASSAY OF MAGNESIUM: CPT

## 2019-03-27 PROCEDURE — 93005 ELECTROCARDIOGRAM TRACING: CPT

## 2019-03-27 PROCEDURE — 84484 ASSAY OF TROPONIN QUANT: CPT

## 2019-03-27 PROCEDURE — 81003 URINALYSIS AUTO W/O SCOPE: CPT

## 2019-03-27 PROCEDURE — 36415 COLL VENOUS BLD VENIPUNCTURE: CPT

## 2019-03-27 PROCEDURE — 96360 HYDRATION IV INFUSION INIT: CPT

## 2019-03-27 PROCEDURE — 81025 URINE PREGNANCY TEST: CPT

## 2019-03-27 PROCEDURE — 80053 COMPREHEN METABOLIC PANEL: CPT

## 2019-03-27 PROCEDURE — 85379 FIBRIN DEGRADATION QUANT: CPT

## 2019-03-27 NOTE — ED
Syncope HPI





- General


Chief Complaint: Syncope


Stated Complaint: Syncope


Time Seen by Provider: 03/27/19 16:04


Source: patient, RN notes reviewed, old records reviewed


Mode of arrival: ambulatory


Limitations: no limitations





- History of Present Illness


Initial Comments: 





This is a 33-year-old female the ER for evaluation.  Patient resents today for 

syncopal event that occurred last night.  Denies any chest pain or shortness of 

breath is improved to diminished appetite, no drugs or alcohol abuse per 

patient.  No recent change in medications.  Patient states she is feels little 

bit weak throughout the day, no recurrent episodes of syncope or passing out and

currently asymptomatic aside from weakness and dehydration


MD Complaint: loss of consciousness


-: days(s)


Prodromal Symptoms: none


-: second(s)


Witnessed: yes - by bystander


Injuries Sustained Associated with Event: None


Current Symptoms: back to baseline, weakness


History: previous syncopal episode


Context: at rest


Treatments Prior to Arrival: none





- Related Data


                                Home Medications











 Medication  Instructions  Recorded  Confirmed


 


Paliperidone IM [Invega Sustenna] 234 mg IM Q28D 03/27/19 03/27/19


 


Venlafaxine HCl ER [Effexor Xr] 75 mg PO DAILY 03/27/19 03/27/19








                                  Previous Rx's











 Medication  Instructions  Recorded


 


OXcarbazepine [Trileptal] 450 mg PO BID #86 tab 08/30/17











                                    Allergies











Allergy/AdvReac Type Severity Reaction Status Date / Time


 


No Known Allergies Allergy   Verified 03/27/19 16:28














Review of Systems


ROS Statement: 


Those systems with pertinent positive or pertinent negative responses have been 

documented in the HPI.





ROS Other: All systems not noted in ROS Statement are negative.





Past Medical History


Past Medical History: No Reported History


Additional Past Medical History / Comment(s): First pregnancy was a Vaginal 

Delivery in 2007, 9 lbs. 1 ounce.  This is her second pregnancy.  she has had 

good prenatal care with me since 11 weeks gestation.her blood type is A+ 

antibody is negative, rubella immune, RPR nonreactive, HIV nonreactive, 

hepatitis B negative. She declined quad screen but had a normal anatomy 

ultrasoundat 19 weeks. group beta strep is negative.


History of Any Multi-Drug Resistant Organisms: None Reported


Past Surgical History: Orthopedic Surgery


Additional Past Surgical History / Comment(s): 1.surgery on right leg 2004, tavia 

in her femur. 2. LEEP


Past Anesthesia/Blood Transfusion Reactions: No Reported Reaction


Past Psychological History: Anxiety, Bipolar, Depression


Smoking Status: Current every day smoker


Past Drug Use History: Marijuana





General Exam


Limitations: no limitations


General appearance: alert, in no apparent distress


Head exam: Present: atraumatic, normocephalic, normal inspection


Eye exam: Present: normal appearance, PERRL, EOMI.  Absent: scleral icterus, co

njunctival injection, periorbital swelling


ENT exam: Present: normal exam, mucous membranes moist


Neck exam: Present: normal inspection.  Absent: tenderness, meningismus, 

lymphadenopathy


Respiratory exam: Present: normal lung sounds bilaterally.  Absent: respiratory 

distress, wheezes, rales, rhonchi, stridor


Cardiovascular Exam: Present: regular rate, normal rhythm, normal heart sounds. 

 Absent: systolic murmur, diastolic murmur, rubs, gallop, clicks


GI/Abdominal exam: Present: soft, normal bowel sounds.  Absent: distended, 

tenderness, guarding, rebound, rigid


Extremities exam: Present: normal inspection, full ROM, normal capillary refill.

  Absent: tenderness, pedal edema, joint swelling, calf tenderness


Back exam: Present: normal inspection


Neurological exam: Present: alert, oriented X3, CN II-XII intact


Psychiatric exam: Present: normal affect, normal mood


Skin exam: Present: warm, dry, intact, normal color.  Absent: rash





Course


                                   Vital Signs











  03/27/19 03/27/19





  15:59 18:13


 


Temperature 98.3 F 


 


Pulse Rate 111 H 65


 


Respiratory 16 16





Rate  


 


Blood Pressure 103/57 112/65


 


O2 Sat by Pulse 97 99





Oximetry  














- Reevaluation(s)


Reevaluation #1: 





03/27/19 18:33


Medical record is reviewed


Reevaluation #2: 





03/27/19 18:33


Patient symptoms are significantly improved with hydration





EKG Findings





- EKG Comments:


EKG Findings:: EKG shows NSR rate of 76  QTc 425





Medical Decision Making





- Medical Decision Making





33 female the ER syncopal event improvement with hydration, patient is able to 

walk and ambulate without difficulty.  Patient can be discharged home





- Lab Data


Result diagrams: 


                                 03/27/19 16:30





                                 03/27/19 16:30


                                   Lab Results











  03/27/19 03/27/19 03/27/19 Range/Units





  16:30 16:30 16:30 


 


WBC  7.0    (3.8-10.6)  k/uL


 


RBC  4.69    (3.80-5.40)  m/uL


 


Hgb  13.7    (11.4-16.0)  gm/dL


 


Hct  41.6    (34.0-46.0)  %


 


MCV  88.6    (80.0-100.0)  fL


 


MCH  29.2    (25.0-35.0)  pg


 


MCHC  33.0    (31.0-37.0)  g/dL


 


RDW  13.5    (11.5-15.5)  %


 


Plt Count  188    (150-450)  k/uL


 


Neutrophils %  69    %


 


Lymphocytes %  24    %


 


Monocytes %  3    %


 


Eosinophils %  2    %


 


Basophils %  1    %


 


Neutrophils #  4.9    (1.3-7.7)  k/uL


 


Lymphocytes #  1.7    (1.0-4.8)  k/uL


 


Monocytes #  0.2    (0-1.0)  k/uL


 


Eosinophils #  0.1    (0-0.7)  k/uL


 


Basophils #  0.1    (0-0.2)  k/uL


 


PT    10.3  (9.0-12.0)  sec


 


INR    1.0  (<1.2)  


 


APTT    24.6  (22.0-30.0)  sec


 


D-Dimer    0.25  (<0.60)  mg/L FEU


 


Sodium   138   (137-145)  mmol/L


 


Potassium   3.7   (3.5-5.1)  mmol/L


 


Chloride   104   ()  mmol/L


 


Carbon Dioxide   27   (22-30)  mmol/L


 


Anion Gap   7   mmol/L


 


BUN   5 L   (7-17)  mg/dL


 


Creatinine   0.60   (0.52-1.04)  mg/dL


 


Est GFR (CKD-EPI)AfAm   >90   (>60 ml/min/1.73 sqM)  


 


Est GFR (CKD-EPI)NonAf   >90   (>60 ml/min/1.73 sqM)  


 


Glucose   101 H   (74-99)  mg/dL


 


Calcium   9.4   (8.4-10.2)  mg/dL


 


Magnesium   1.6   (1.6-2.3)  mg/dL


 


Total Bilirubin   0.4   (0.2-1.3)  mg/dL


 


AST   41 H   (14-36)  U/L


 


ALT   79 H   (9-52)  U/L


 


Alkaline Phosphatase   57   ()  U/L


 


Troponin I     (0.000-0.034)  ng/mL


 


Total Protein   6.4   (6.3-8.2)  g/dL


 


Albumin   3.9   (3.5-5.0)  g/dL


 


Urine Color     


 


Urine Appearance     (Clear)  


 


Urine pH     (5.0-8.0)  


 


Ur Specific Gravity     (1.001-1.035)  


 


Urine Protein     (Negative)  


 


Urine Glucose (UA)     (Negative)  


 


Urine Ketones     (Negative)  


 


Urine Blood     (Negative)  


 


Urine Nitrite     (Negative)  


 


Urine Bilirubin     (Negative)  


 


Urine Urobilinogen     (<2.0)  mg/dL


 


Ur Leukocyte Esterase     (Negative)  


 


Urine HCG, Qual     (Not Detectd)  














  03/27/19 03/27/19 03/27/19 Range/Units





  16:30 16:30 16:30 


 


WBC     (3.8-10.6)  k/uL


 


RBC     (3.80-5.40)  m/uL


 


Hgb     (11.4-16.0)  gm/dL


 


Hct     (34.0-46.0)  %


 


MCV     (80.0-100.0)  fL


 


MCH     (25.0-35.0)  pg


 


MCHC     (31.0-37.0)  g/dL


 


RDW     (11.5-15.5)  %


 


Plt Count     (150-450)  k/uL


 


Neutrophils %     %


 


Lymphocytes %     %


 


Monocytes %     %


 


Eosinophils %     %


 


Basophils %     %


 


Neutrophils #     (1.3-7.7)  k/uL


 


Lymphocytes #     (1.0-4.8)  k/uL


 


Monocytes #     (0-1.0)  k/uL


 


Eosinophils #     (0-0.7)  k/uL


 


Basophils #     (0-0.2)  k/uL


 


PT     (9.0-12.0)  sec


 


INR     (<1.2)  


 


APTT     (22.0-30.0)  sec


 


D-Dimer     (<0.60)  mg/L FEU


 


Sodium     (137-145)  mmol/L


 


Potassium     (3.5-5.1)  mmol/L


 


Chloride     ()  mmol/L


 


Carbon Dioxide     (22-30)  mmol/L


 


Anion Gap     mmol/L


 


BUN     (7-17)  mg/dL


 


Creatinine     (0.52-1.04)  mg/dL


 


Est GFR (CKD-EPI)AfAm     (>60 ml/min/1.73 sqM)  


 


Est GFR (CKD-EPI)NonAf     (>60 ml/min/1.73 sqM)  


 


Glucose     (74-99)  mg/dL


 


Calcium     (8.4-10.2)  mg/dL


 


Magnesium     (1.6-2.3)  mg/dL


 


Total Bilirubin     (0.2-1.3)  mg/dL


 


AST     (14-36)  U/L


 


ALT     (9-52)  U/L


 


Alkaline Phosphatase     ()  U/L


 


Troponin I   <0.012   (0.000-0.034)  ng/mL


 


Total Protein     (6.3-8.2)  g/dL


 


Albumin     (3.5-5.0)  g/dL


 


Urine Color    Light Yellow  


 


Urine Appearance    Clear  (Clear)  


 


Urine pH    6.5  (5.0-8.0)  


 


Ur Specific Gravity    1.004  (1.001-1.035)  


 


Urine Protein    Negative  (Negative)  


 


Urine Glucose (UA)    Negative  (Negative)  


 


Urine Ketones    Negative  (Negative)  


 


Urine Blood    Negative  (Negative)  


 


Urine Nitrite    Negative  (Negative)  


 


Urine Bilirubin    Negative  (Negative)  


 


Urine Urobilinogen    2.0  (<2.0)  mg/dL


 


Ur Leukocyte Esterase    Negative  (Negative)  


 


Urine HCG, Qual  Not Detected    (Not Detectd)  














Disposition


Clinical Impression: 


 Vasovagal syncope





Disposition: HOME SELF-CARE


Condition: Good


Instructions (If sedation given, give patient instructions):  Syncope (ED)


Is patient prescribed a controlled substance at d/c from ED?: No


Referrals: 


Pankaj Acosta MD [Primary Care Provider] - 1-2 days

## 2019-06-22 ENCOUNTER — HOSPITAL ENCOUNTER (EMERGENCY)
Dept: HOSPITAL 47 - EC | Age: 34
Discharge: HOME | End: 2019-06-22
Payer: COMMERCIAL

## 2019-06-22 VITALS
DIASTOLIC BLOOD PRESSURE: 56 MMHG | TEMPERATURE: 98.2 F | HEART RATE: 79 BPM | RESPIRATION RATE: 16 BRPM | SYSTOLIC BLOOD PRESSURE: 98 MMHG

## 2019-06-22 DIAGNOSIS — K02.9: Primary | ICD-10-CM

## 2019-06-22 DIAGNOSIS — F41.9: ICD-10-CM

## 2019-06-22 DIAGNOSIS — Z79.899: ICD-10-CM

## 2019-06-22 DIAGNOSIS — F17.200: ICD-10-CM

## 2019-06-22 DIAGNOSIS — K04.7: ICD-10-CM

## 2019-06-22 DIAGNOSIS — F32.9: ICD-10-CM

## 2019-06-22 PROCEDURE — 96372 THER/PROPH/DIAG INJ SC/IM: CPT

## 2019-06-22 PROCEDURE — 99282 EMERGENCY DEPT VISIT SF MDM: CPT

## 2019-06-22 NOTE — ED
ENT HPI





- General


Chief complaint: Dental/Oral


Stated complaint: Dental Pain


Time Seen by Provider: 06/22/19 15:54


Source: patient


Mode of arrival: ambulatory


Limitations: no limitations





- History of Present Illness


Initial comments: 





Patient is a 34-year-old female presenting to the emergency Department with 

complaints of left-sided dental pain 2 weeks.  Patient states he has not seen a

dentist in about 3 years.  Patient states that pain has been increasing the last

2 weeks along with some mild swelling.  Patient has been taking Tylenol for the 

pain but is not helping.  Patient denies fever, chills, nausea, vomiting, 

difficult to swelling.  No other complaints at this time.





- Related Data


                                Home Medications











 Medication  Instructions  Recorded  Confirmed


 


Paliperidone IM [Invega Sustenna] 234 mg IM Q28D 03/27/19 03/27/19


 


Venlafaxine HCl ER [Effexor Xr] 75 mg PO DAILY 03/27/19 03/27/19








                                  Previous Rx's











 Medication  Instructions  Recorded


 


OXcarbazepine [Trileptal] 450 mg PO BID #86 tab 08/30/17


 


Penicillin V Potassium [Pen Vee K] 500 mg PO QID 7 Days #28 tablet 06/22/19











                                    Allergies











Allergy/AdvReac Type Severity Reaction Status Date / Time


 


No Known Allergies Allergy   Verified 06/22/19 15:17














Review of Systems


ROS Statement: 


Those systems with pertinent positive or pertinent negative responses have been 

documented in the HPI.





ROS Other: All systems not noted in ROS Statement are negative.





Past Medical History


Past Medical History: No Reported History


Additional Past Medical History / Comment(s): First pregnancy was a Vaginal 

Delivery in 2007, 9 lbs. 1 ounce.  This is her second pregnancy.  she has had 

good prenatal care with me since 11 weeks gestation.her blood type is A+ 

antibody is negative, rubella immune, RPR nonreactive, HIV nonreactive, 

hepatitis B negative. She declined quad screen but had a normal anatomy 

ultrasoundat 19 weeks. group beta strep is negative.


History of Any Multi-Drug Resistant Organisms: None Reported


Past Surgical History: Orthopedic Surgery


Additional Past Surgical History / Comment(s): 1.surgery on right leg 2004, tavia 

in her femur. 2. LEEP


Past Anesthesia/Blood Transfusion Reactions: No Reported Reaction


Past Psychological History: Anxiety, Bipolar, Depression


Smoking Status: Current every day smoker


Past Drug Use History: Marijuana





General Exam





- General Exam Comments


Initial Comments: 





GENERAL: Well-appearing, well-nourished and in no acute distress.


HEAD: Atraumatic, normocephalic.


EYES: Pupils equal round and reactive to light, extraocular movements intact, 

sclera anicteric, conjunctiva are normal.


ENT: TMs normal, nares patent, oropharynx clear without exudates.  Moist mucous 

membranes.  Patient has several decaying teeth.  Patient has erythema of the 

left upper gumline and mild swelling on the left upper jaw line.  Pain with 

tenderness over the left upper teeth.


NECK: Normal range of motion, supple without lymphadenopathy or JVD.


LUNGS: Breath sounds clear to auscultation bilaterally and equal.  No wheezes 

rales or rhonchi.


HEART: Regular rate and rhythm without murmurs, rubs or gallops.


ABDOMEN: Soft, nontender, normoactive bowel sounds.  No guarding, no rebound.  

No masses appreciated.


: Deferred 


EXTREMITIES: Normal range of motion, no pitting or edema.  No clubbing or cya

nosis.


NEUROLOGICAL: Cranial nerves II through XII grossly intact.  Normal speech, 

normal gait.


PSYCH: Normal mood, normal affect.


SKIN: Warm, Dry, normal turgor, no rashes or lesions noted.


Limitations: no limitations





Course


                                   Vital Signs











  06/22/19





  15:15


 


Temperature 98.2 F


 


Pulse Rate 79


 


Respiratory 16





Rate 


 


Blood Pressure 98/56


 


O2 Sat by Pulse 100





Oximetry 














Medical Decision Making





- Medical Decision Making





Patient is a 34-year-old female complaining of dental pain 2 weeks.  Patient 

has mild erythema and swelling of the left side of her mouth along with several 

decaying teeth.  Patient has not seen a dentist in 3 years.  Patient was given 

Toradol for her pain and course of antibiotics.  Patient will follow up with 

dentist on Monday or Tuesday.  Patient will be discharged home.  Return 

parameters were discussed.  





Disposition


Clinical Impression: 


 Dental caries, Dental abscess





Disposition: HOME SELF-CARE


Condition: Stable


Instructions (If sedation given, give patient instructions):  Dental Abscess 

(ED), Dental Caries (ED)


Additional Instructions: 


Please return to the Emergency Department if symptoms worsen or any other 

concerns.


Follow-up with dentist in 1 to 3 days.


Prescriptions: 


Penicillin V Potassium [Pen Vee K] 500 mg PO QID 7 Days #28 tablet


Is patient prescribed a controlled substance at d/c from ED?: No


Referrals: 


Pankaj Acosta MD [Primary Care Provider] - 1-2 days

## 2019-08-02 ENCOUNTER — HOSPITAL ENCOUNTER (OUTPATIENT)
Dept: HOSPITAL 47 - RADUSWWP | Age: 34
Discharge: HOME | End: 2019-08-02
Payer: COMMERCIAL

## 2019-08-02 DIAGNOSIS — K31.89: Primary | ICD-10-CM

## 2019-08-02 PROCEDURE — 74246 X-RAY XM UPR GI TRC 2CNTRST: CPT

## 2019-08-02 NOTE — FL
EXAMINATION TYPE: FL UGI air

 

DATE OF EXAM: 8/2/2019

 

COMPARISON: NONE

 

HISTORY: Nausea and vomiting for 6 months

 

TECHNIQUE:  A double contrast UGI study is performed. 1 minute and 48 seconds of fluoroscopy time was
 utilized with 49 fluoroscopic images saved.

 

FINDINGS:   image of the abdomen shows no gross abnormality.

 

The esophagus shows normal motility and emptying into the stomach.  No evidence of hiatal hernia or s
tricture noted.

 

The stomach shows normal distensibility and peristalsis. Thickened gastric rugal folds are seen throu
ghout the stomach.  No evidence of any mass or ulcer disease.  No significant gastroesophageal reflux
 was seen during real time performance of this study.

 

The duodenal bulb, sweep, and proximal small bowel loops are unremarkable.

 

IMPRESSION: Thickened rugal folds within the stomach diffusely most commonly related to gastritis. En
doscopy could be performed with consideration for biopsy. No focal ulcer is seen.

## 2021-01-03 ENCOUNTER — HOSPITAL ENCOUNTER (EMERGENCY)
Dept: HOSPITAL 47 - EC | Age: 36
Discharge: HOME | End: 2021-01-03
Payer: COMMERCIAL

## 2021-01-03 VITALS
HEART RATE: 107 BPM | DIASTOLIC BLOOD PRESSURE: 88 MMHG | TEMPERATURE: 98.2 F | RESPIRATION RATE: 18 BRPM | SYSTOLIC BLOOD PRESSURE: 106 MMHG

## 2021-01-03 DIAGNOSIS — F31.9: ICD-10-CM

## 2021-01-03 DIAGNOSIS — F41.9: ICD-10-CM

## 2021-01-03 DIAGNOSIS — F17.200: ICD-10-CM

## 2021-01-03 DIAGNOSIS — Z79.899: ICD-10-CM

## 2021-01-03 DIAGNOSIS — H00.011: Primary | ICD-10-CM

## 2021-01-03 PROCEDURE — 99283 EMERGENCY DEPT VISIT LOW MDM: CPT

## 2022-10-02 ENCOUNTER — HOSPITAL ENCOUNTER (EMERGENCY)
Dept: HOSPITAL 47 - EC | Age: 37
Discharge: HOME | End: 2022-10-02
Payer: COMMERCIAL

## 2022-10-02 VITALS — RESPIRATION RATE: 18 BRPM | HEART RATE: 93 BPM | SYSTOLIC BLOOD PRESSURE: 140 MMHG | DIASTOLIC BLOOD PRESSURE: 80 MMHG

## 2022-10-02 VITALS — TEMPERATURE: 97.8 F

## 2022-10-02 DIAGNOSIS — M54.50: ICD-10-CM
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
## 2022-10-02 DIAGNOSIS — F17.200: ICD-10-CM

## 2022-10-02 DIAGNOSIS — M25.512: Primary | ICD-10-CM

## 2022-10-02 PROCEDURE — 72110 X-RAY EXAM L-2 SPINE 4/>VWS: CPT

## 2022-10-02 PROCEDURE — 99283 EMERGENCY DEPT VISIT LOW MDM: CPT

## 2022-10-02 NOTE — XR
EXAMINATION TYPE: XR lumbosacral spine min 4V

 

DATE OF EXAM: 10/2/2022 1:30 PM

 

INDICATION: 

Patient age:Female;  37 years old; 

Reason for study: fall; [facility]. 

 

COMPARISON: None

 

TECHNIQUE: Frontal, lateral , bilateral oblique and coned in L5-S1 lateral views of the spine.

 

FINDINGS: No evidence of any acute osseous pathology.  No evidence of loss of vertebral body height i
s seen. There is normal alignment of the lumbar vertebral bodies. No significant degeneration changes
 throughout the spine.

 

IMPRESSION:
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
## 2022-10-02 NOTE — XR
EXAMINATION TYPE: XR shoulder complete LT

 

DATE OF EXAM: 10/2/2022 1:30 PM

 

INDICATION: 

Patient age:Female;  37 years old; 

Reason for study: fall;

 

COMPARISON: None

 

TECHNIQUE: The left shoulder  was examined in AP, internally rotated and scapular Y projections. .  

 

FINDINGS: 

No evidence of acute osseous pathology, joint dislocation, or soft tissue swelling. The remaining por
tions of the visualized chest are unremarkable.

 

IMPRESSION: 

No acute osseous pathology.